# Patient Record
Sex: MALE | Race: WHITE | NOT HISPANIC OR LATINO | Employment: OTHER | URBAN - METROPOLITAN AREA
[De-identification: names, ages, dates, MRNs, and addresses within clinical notes are randomized per-mention and may not be internally consistent; named-entity substitution may affect disease eponyms.]

---

## 2017-02-18 ENCOUNTER — HOSPITAL ENCOUNTER (OUTPATIENT)
Facility: HOSPITAL | Age: 72
Discharge: HOME | End: 2017-02-19
Attending: EMERGENCY MEDICINE | Admitting: INTERNAL MEDICINE
Payer: MEDICARE

## 2017-02-18 DIAGNOSIS — R55 SYNCOPE, UNSPECIFIED SYNCOPE TYPE: Primary | ICD-10-CM

## 2017-02-18 LAB
ANION GAP SERPL CALC-SCNC: 9 MMOL/L
BASOPHILS # BLD AUTO: 0.1 K/UL
BASOPHILS NFR BLD: 0.5 %
BUN SERPL-MCNC: 17 MG/DL
CALCIUM SERPL-MCNC: 8.9 MG/DL
CHLORIDE SERPL-SCNC: 106 MMOL/L
CO2 SERPL-SCNC: 23 MMOL/L
CREAT SERPL-MCNC: 1.2 MG/DL
DIFFERENTIAL METHOD: ABNORMAL
EOSINOPHIL # BLD AUTO: 0.2 K/UL
EOSINOPHIL NFR BLD: 1.5 %
ERYTHROCYTE [DISTWIDTH] IN BLOOD BY AUTOMATED COUNT: 14.3 %
EST. GFR  (AFRICAN AMERICAN): >60 ML/MIN/1.73 M^2
EST. GFR  (NON AFRICAN AMERICAN): >60 ML/MIN/1.73 M^2
GLUCOSE SERPL-MCNC: 103 MG/DL
HCT VFR BLD AUTO: 42.7 %
HGB BLD-MCNC: 14.1 G/DL
INR PPP: 2.5
LYMPHOCYTES # BLD AUTO: 3 K/UL
LYMPHOCYTES NFR BLD: 25.3 %
MCH RBC QN AUTO: 29.4 PG
MCHC RBC AUTO-ENTMCNC: 33 %
MCV RBC AUTO: 89 FL
MONOCYTES # BLD AUTO: 0.5 K/UL
MONOCYTES NFR BLD: 4.6 %
NEUTROPHILS # BLD AUTO: 8 K/UL
NEUTROPHILS NFR BLD: 68.1 %
PLATELET # BLD AUTO: 193 K/UL
PMV BLD AUTO: 8.8 FL
POTASSIUM SERPL-SCNC: 3.7 MMOL/L
PROTHROMBIN TIME: 25.4 SEC
RBC # BLD AUTO: 4.79 M/UL
SODIUM SERPL-SCNC: 138 MMOL/L
TROPONIN I SERPL DL<=0.01 NG/ML-MCNC: <0.006 NG/ML
TSH SERPL DL<=0.005 MIU/L-ACNC: 0.95 UIU/ML
WBC # BLD AUTO: 11.8 K/UL

## 2017-02-18 PROCEDURE — 25000003 PHARM REV CODE 250: Performed by: EMERGENCY MEDICINE

## 2017-02-18 PROCEDURE — G0378 HOSPITAL OBSERVATION PER HR: HCPCS

## 2017-02-18 PROCEDURE — 93005 ELECTROCARDIOGRAM TRACING: CPT

## 2017-02-18 PROCEDURE — 85025 COMPLETE CBC W/AUTO DIFF WBC: CPT

## 2017-02-18 PROCEDURE — 84484 ASSAY OF TROPONIN QUANT: CPT

## 2017-02-18 PROCEDURE — 84443 ASSAY THYROID STIM HORMONE: CPT

## 2017-02-18 PROCEDURE — 99285 EMERGENCY DEPT VISIT HI MDM: CPT

## 2017-02-18 PROCEDURE — 85610 PROTHROMBIN TIME: CPT

## 2017-02-18 PROCEDURE — 36415 COLL VENOUS BLD VENIPUNCTURE: CPT

## 2017-02-18 PROCEDURE — 93010 ELECTROCARDIOGRAM REPORT: CPT | Mod: ,,, | Performed by: INTERNAL MEDICINE

## 2017-02-18 PROCEDURE — 84484 ASSAY OF TROPONIN QUANT: CPT | Mod: 91

## 2017-02-18 PROCEDURE — 80048 BASIC METABOLIC PNL TOTAL CA: CPT

## 2017-02-18 RX ORDER — TERAZOSIN 5 MG/1
5 CAPSULE ORAL NIGHTLY
Status: DISCONTINUED | OUTPATIENT
Start: 2017-02-18 | End: 2017-02-19

## 2017-02-18 RX ORDER — LOSARTAN POTASSIUM 100 MG/1
100 TABLET ORAL DAILY
Status: ON HOLD | COMMUNITY
End: 2017-02-18

## 2017-02-18 RX ORDER — LOSARTAN POTASSIUM 25 MG/1
100 TABLET ORAL DAILY
Status: DISCONTINUED | OUTPATIENT
Start: 2017-02-19 | End: 2017-02-19 | Stop reason: HOSPADM

## 2017-02-18 RX ORDER — TAMSULOSIN HYDROCHLORIDE 0.4 MG/1
0.4 CAPSULE ORAL DAILY
COMMUNITY
End: 2021-03-09 | Stop reason: SDUPTHER

## 2017-02-18 RX ORDER — NAPROXEN SODIUM 220 MG/1
81 TABLET, FILM COATED ORAL DAILY
Status: DISCONTINUED | OUTPATIENT
Start: 2017-02-19 | End: 2017-02-19 | Stop reason: HOSPADM

## 2017-02-18 RX ORDER — CLOPIDOGREL BISULFATE 75 MG/1
75 TABLET ORAL DAILY
Status: DISCONTINUED | OUTPATIENT
Start: 2017-02-19 | End: 2017-02-19 | Stop reason: HOSPADM

## 2017-02-18 RX ORDER — ONDANSETRON 2 MG/ML
4 INJECTION INTRAMUSCULAR; INTRAVENOUS EVERY 8 HOURS PRN
Status: DISCONTINUED | OUTPATIENT
Start: 2017-02-18 | End: 2017-02-19 | Stop reason: HOSPADM

## 2017-02-18 RX ORDER — TERAZOSIN 5 MG/1
5 CAPSULE ORAL NIGHTLY
Status: ON HOLD | COMMUNITY
End: 2017-02-18

## 2017-02-18 RX ORDER — BISOPROLOL FUMARATE 5 MG/1
5 TABLET, FILM COATED ORAL DAILY
Status: DISCONTINUED | OUTPATIENT
Start: 2017-02-19 | End: 2017-02-19

## 2017-02-18 RX ORDER — WARFARIN 2 MG/1
4 TABLET ORAL DAILY
Status: DISCONTINUED | OUTPATIENT
Start: 2017-02-19 | End: 2017-02-19 | Stop reason: HOSPADM

## 2017-02-18 RX ORDER — BISOPROLOL FUMARATE AND HYDROCHLOROTHIAZIDE 5; 6.25 MG/1; MG/1
1 TABLET ORAL DAILY
Status: ON HOLD | COMMUNITY
End: 2017-02-18

## 2017-02-18 RX ORDER — BISOPROLOL FUMARATE AND HYDROCHLOROTHIAZIDE 5; 6.25 MG/1; MG/1
1 TABLET ORAL DAILY
Status: DISCONTINUED | OUTPATIENT
Start: 2017-02-19 | End: 2017-02-18 | Stop reason: CLARIF

## 2017-02-18 RX ORDER — PRAVASTATIN SODIUM 40 MG/1
80 TABLET ORAL DAILY
Status: DISCONTINUED | OUTPATIENT
Start: 2017-02-19 | End: 2017-02-19 | Stop reason: HOSPADM

## 2017-02-18 RX ORDER — METOPROLOL TARTRATE 25 MG/1
25 TABLET, FILM COATED ORAL 2 TIMES DAILY
Status: DISCONTINUED | OUTPATIENT
Start: 2017-02-18 | End: 2017-02-19

## 2017-02-18 RX ADMIN — METOPROLOL TARTRATE 25 MG: 25 TABLET ORAL at 08:02

## 2017-02-18 RX ADMIN — TERAZOSIN HYDROCHLORIDE ANHYDROUS 5 MG: 5 CAPSULE ORAL at 08:02

## 2017-02-18 NOTE — IP AVS SNAPSHOT
[address] [place] [address]  Phone: [phone]           Patient Discharge Instructions     Our goal is to set you up for success. This packet includes information on your condition, medications, and your home care. It will help you to care for yourself so you don't get sicker and need to go back to the hospital.     Please ask your nurse if you have any questions.        There are many details to remember when preparing to leave the hospital. Here is what you will need to do:    1. Take your medicine. If you are prescribed medications, review your Medication List in the following pages. You may have new medications to  at the pharmacy and others that you'll need to stop taking. Review the instructions for how and when to take your medications. Talk with your doctor or nurses if you are unsure of what to do.     2. Go to your follow-up appointments. Specific follow-up information is listed in the following pages. Your may be contacted by a transition nurse or clinical provider about future appointments. Be sure we have all of the phone numbers to reach you, if needed. Please contact your provider's office if you are unable to make an appointment.     3. Watch for warning signs. Your doctor or nurse will give you detailed warning signs to watch for and when to call for assistance. These instructions may also include educational information about your condition. If you experience any of warning signs to your health, call your doctor.               [facility]  Unless otherwise directed by your provider, please contact [facility], our nurse care line that is available for 24/7 assistance.     [phone] (toll-free)    Registered nurses in the [facility] provide clinical advisement, health education, appointment booking, and other advisory services.                    ** Verify the list of medication(s) below is accurate and up to date.  Carry this with you in case of emergency. If your medications have changed, please notify your healthcare provider.             Medication List      CONTINUE taking these medications        Additional Info                      aspirin 81 MG Chew   Refills:  0   Dose:  81 mg    Last time this was given:  81 mg on 2/19/2017  9:34 AM   Instructions:  Take 81 mg by mouth once daily.     Begin Date    AM    Noon    PM    Bedtime       clopidogrel 75 mg tablet   Commonly known as:  PLAVIX   Refills:  1    Last time this was given:  75 mg on 2/19/2017  9:34 AM     Begin Date    AM    Noon    PM    Bedtime       EDARBYCLOR 40-12.5 mg Tab   Refills:  0   Generic drug:  azilsartan med-chlorthalidone    Instructions:  Take by mouth.     Begin Date    AM    Noon    PM    Bedtime       FLUZONE HIGH-DOSE 2015-16 (PF) 180 mcg/0.5 mL Syrg   Refills:  0   Generic drug:  flu vacc ts 2015-16(65yr+)(PF)      Begin Date    AM    Noon    PM    Bedtime       folic acid 1 MG tablet   Commonly known as:  FOLVITE   Refills:  2      Begin Date    AM    Noon    PM    Bedtime       metoprolol tartrate 25 MG tablet   Commonly known as:  LOPRESSOR   Refills:  1    Last time this was given:  25 mg on 2/18/2017  8:19 PM     Begin Date    AM    Noon    PM    Bedtime       pravastatin 80 MG tablet   Commonly known as:  PRAVACHOL   Refills:  2    Last time this was given:  80 mg on 2/19/2017  9:34 AM     Begin Date    AM    Noon    PM    Bedtime       tamsulosin 0.4 mg Cp24   Commonly known as:  FLOMAX   Refills:  0   Dose:  0.4 mg    Instructions:  Take 0.4 mg by mouth once daily.     Begin Date    AM    Noon    PM    Bedtime       warfarin 4 MG tablet   Commonly known as:  COUMADIN   Refills:  0      Begin Date    AM    Noon    PM    Bedtime                  Please bring to all follow up appointments:    1. A copy of your discharge instructions.  2. All medicines you are currently taking in their original bottles.  3. Identification and insurance  card.    Please arrive 15 minutes ahead of scheduled appointment time.    Please call 24 hours in advance if you must reschedule your appointment and/or time.        Follow-up Information     Follow up with Travis Go MD In 1 week.    Specialty:  Internal Medicine    Why:  follow up of blood pressure    Contact information:    [address] [place] [address]  [phone]          Discharge Instructions     Future Orders    Diet general     Questions:    Total calories:      Fat restriction, if any:      Protein restriction, if any:      Na restriction, if any:      Fluid restriction:      Additional restrictions:          Discharge Instructions       Thank you for choosing [facility] for your medical care. The primary doctor who is taking care of you at the time of your discharge is Alicia Coy MD.     You were admitted to the hospital with Syncope.     Please note your discharge instructions, including diet/activity restrictions, follow-up appointments, and medication changes.  If you have any questions about your medical issues, prescriptions, or any other questions, please feel free to contact the [facility] Medicine Dept at [phone] and we will help.    If you are previously with Home health, outpatient PT/OT or under a therapy program, you are cleared to return to those programs.    Please direct all long term medication refills and follow up to your primary care provider, Travis Go MD. Thank you again for letting us take care of your health care needs.      Discharge References/Attachments     NEAR SYNCOPE, UNKNOWN (ENGLISH)    SYNCOPE, TREATING: PREVENTION (ENGLISH)        Primary Diagnosis     Your primary diagnosis was:  Fainting      Admission Information     Date & Time Provider Department [facility]    2/18/2017  3:51 PM Alicia Coy MD [facility] [id number]      Care Providers     Provider Role Specialty Primary office phone     "Alicia Coy MD Attending Provider Internal Medicine [phone]      Your Vitals Were     BP Pulse Temp Resp Height Weight    112/72 (BP Location: Left arm, Patient Position: Lying) 86 97.5 °F (36.4 °C) (Oral) 18 5' 9" (1.753 m) 111.1 kg (245 lb)    SpO2 BMI             96% 36.18 kg/m2         Recent Lab Values     No lab values to display.      Allergies as of 2/19/2017     No Known Allergies      Advance Directives     An advance directive is a document which, in the event you are no longer able to make decisions for yourself, tells your healthcare team what kind of treatment you do or do not want to receive, or who you would like to make those decisions for you.  If you do not currently have an advance directive, [facility] encourages you to create one.  For more information call:  [phone]), [phone] ([phone]),  or log on to [email or web address].        Smoking Cessation     If you would like to quit smoking:   You may be eligible for free services if you are a [place] resident and started smoking cigarettes before September 1, 1988.  Call the Smoking Cessation [employer] (RUST) toll free at (563) 423-7900 or (655) 931-5293.   Call 4-141-QUIT-NOW if you do not meet the above criteria.            Language Assistance Services     ATTENTION: Language assistance services are available, free of charge. Please call [phone].      ATENCIÓN: Si habla español, tiene a baker disposición servicios gratuitos de asistencia lingüística. Llame al [phone].     CHÚ Ý: N?u b?n nói Ti?ng Vi?t, có các d?ch v? h? tr? ngôn ng? mi?n phí dành cho b?n. G?i s? [phone].        Coumadin Discharge Instructions                         [facility] Sign-Up     Activating your [facility] account is as easy as 1-2-3!     1) Visit [email or web address], select Sign Up Now, enter this activation code and your date of birth, then select Next.  [email or web address]  Expires: 4/5/2017  1:08 PM      2) Create a username " and password to use when you visit [facility] in the future and select a security question in case you lose your password and select Next.    3) Enter your e-mail address and click Sign Up!    Additional Information  If you have questions, please e-mail [email or web address] or call [phone] to talk to our [facility] staff. Remember, [facility] is NOT to be used for urgent needs. For medical emergencies, dial 911.          [facility] complies with applicable Federal civil rights laws and does not discriminate on the basis of race, color, national origin, age, disability, or sex.

## 2017-02-18 NOTE — ED NOTES
Report given to Omi (RN, second floor).  Patient prepared for transport to bed 213 A; pending notification of Dr. Tovar.

## 2017-02-18 NOTE — ED NOTES
AAOx4, skin warm/dry, respirations even/unlabored.  NAD.  Placed on cardiac, BP and O2 sat monitors.

## 2017-02-18 NOTE — ED PROVIDER NOTES
Encounter Date: 2/18/2017    SCRIBE #1 NOTE: I, Mia Preston, am scribing for, and in the presence of, Dr. Mathis.       History     Chief Complaint   Patient presents with    Loss of Consciousness     Review of patient's allergies indicates:  No Known Allergies  HPI Comments: 02/18/2017  3:59 PM     Chief Complaint: Syncopal Episode      The patient is a 71 y.o. male with a PMHx of anticoagulant long-term use on Coumadin and Plavix; clotting disorder; CAD; encounter for blood transfusion; and HTN who is presenting with a sudden onset of an acute syncopal episode that occurred PTA. Pt reported that he was standing waiting for the parade to start when he started to c/o light-headedness. Then he got a chair and sat down in the shade, which is the last thing he remembers. Pt stated that the sun was out and he was wearing a jacket today. His wife reported that that the pt was only standing for 15 minutes prior to sitting down. She stated that their granddaughter was talking with the pt prior to the syncopal episode, but then the pt stopped responding to her. His wife reported that they removed the pt's sunglasses and his eyes were open, but he was unresponsive. She stated that they poured ice water on him and then he came to. The syncopal episode lasted about 1 minute or so. Upon arrival to ED, pt c/o continued light-headedness. He reported feeling fine this morning. No hx of syncope. No fever or appetite change. No rhinorrhea. No cough or SOB. No CP. No blood in stools, dark stools, vomiting, or diarrhea. No headache. No EtOH use today. Pt has a past surgical history that includes CABG; knee surgery ,back surgery- bilateral; and Cholecystectomy.      The history is provided by the patient and the spouse.     Past Medical History   Diagnosis Date    Anticoagulant long-term use     Clotting disorder     Coronary artery disease     Encounter for blood transfusion     Hypertension      Past Medical History  Pertinent Negatives   Diagnosis Date Noted    Transfusion reaction 12/15/2015     Past Surgical History   Procedure Laterality Date    Coronary artery bypass graft      Knee surgery ,back surgery(bilateral)      Cholecystectomy       History reviewed. No pertinent family history.  Social History   Substance Use Topics    Smoking status: Former Smoker     Quit date: 1/1/1980    Smokeless tobacco: None      Comment: smoked pipe    Alcohol use 0.0 oz/week     0 Standard drinks or equivalent per week      Comment: socially     Review of Systems   Constitutional: Negative for appetite change and fever.   HENT: Negative for rhinorrhea.    Eyes: Negative for visual disturbance.   Respiratory: Negative for cough and shortness of breath.    Cardiovascular: Negative for chest pain.   Gastrointestinal: Negative for blood in stool, diarrhea and vomiting.        No dark stools.   Genitourinary: Negative for dysuria.   Musculoskeletal: Negative for back pain.   Skin: Negative for rash.   Neurological: Positive for syncope and light-headedness. Negative for headaches.   Hematological: Does not bruise/bleed easily.   Psychiatric/Behavioral: Negative for confusion.       Physical Exam   Initial Vitals   BP Pulse Resp Temp SpO2   02/18/17 1554 02/18/17 1554 02/18/17 1554 02/18/17 1554 02/18/17 1554   128/73 69 16 97.6 °F (36.4 °C) 97 %     Physical Exam    Nursing note and vitals reviewed.  Constitutional: He appears well-developed and well-nourished.   HENT:   Head: Normocephalic and atraumatic.   Mouth/Throat: Oropharynx is clear and moist.   Eyes: Conjunctivae and EOM are normal. Pupils are equal, round, and reactive to light.   Neck: Neck supple.   Cardiovascular: Normal rate, regular rhythm, normal heart sounds and intact distal pulses. Exam reveals no gallop and no friction rub.    No murmur heard.  Pulmonary/Chest: Breath sounds normal. He has no wheezes. He has no rhonchi. He has no rales.   Abdominal: Soft. He  exhibits no distension. There is no tenderness.   Musculoskeletal: Normal range of motion.   Trace pitting pedal edema bilaterally.   Neurological: He is alert and oriented to person, place, and time. Gait normal.   Cranial nerves 3-12 intact.  5/5 strength and sensation.   Skin:   Well healed sternal incision.   Psychiatric: He has a normal mood and affect.         ED Course   Procedures  Labs Reviewed   CBC W/ AUTO DIFFERENTIAL - Abnormal; Notable for the following:        Result Value    MPV 8.8 (*)     Gran # 8.0 (*)     All other components within normal limits   PROTIME-INR - Abnormal; Notable for the following:     Prothrombin Time 25.4 (*)     INR 2.5 (*)     All other components within normal limits   BASIC METABOLIC PANEL   TROPONIN I                        Scribe Attestation:   Scribe #1: I performed the above scribed service and the documentation accurately describes the services I performed. I attest to the accuracy of the note.    Attending Attestation:           Physician Attestation for Scribe:  Physician Attestation Statement for Scribe #1: I, Dr. Mathis, reviewed documentation, as scribed by Mia Preston in my presence, and it is both accurate and complete.         Jeremias Hall is a 71 y.o. male presenting with syncopal episode.  Etiology is uncertain.  Patient has known history of CAD.  He is asymptomatic at present.  Very low suspicion for intracranial process such as CVA.  I do not think further brain imaging is indicated.  I will admit for further cardiac monitoring.  Initial EKG shows no sign of acute ischemia or arrhythmia.  Initial cardiac biomarker is negative.  I will bed to hospitalist service for further workup.  He does have history of DVT but is therapeutic on Coumadin with no signs of PE such as chest pain or dyspnea with no tachycardia or hypoxia on evaluation here.  I do not think further d-dimer or CT angiography are indicated.  I will discuss with hospitalist service  for admission to Dr. Coy.        ED Course   Comment By Time   EKG:  NSR, rate of 70, normal intervals, L axis.  Old appearing T-wave flattening in II, aVF, aVL.  No new ST/T wave changes compared to last prior.  No sign of arrhythmia. Jordon Mathis MD 02/18 1300     Clinical Impression:   The encounter diagnosis was Syncope, unspecified syncope type.          Jordon Mathis MD  02/18/17 7705

## 2017-02-19 VITALS
OXYGEN SATURATION: 96 % | HEART RATE: 86 BPM | SYSTOLIC BLOOD PRESSURE: 112 MMHG | WEIGHT: 245 LBS | HEIGHT: 69 IN | DIASTOLIC BLOOD PRESSURE: 72 MMHG | TEMPERATURE: 98 F | BODY MASS INDEX: 36.29 KG/M2 | RESPIRATION RATE: 18 BRPM

## 2017-02-19 PROBLEM — Z92.29 HX: LONG TERM ANTICOAGULANT USE: Status: ACTIVE | Noted: 2017-02-19

## 2017-02-19 PROBLEM — I10 ESSENTIAL HYPERTENSION: Status: ACTIVE | Noted: 2017-02-19

## 2017-02-19 PROBLEM — I25.10 CORONARY ARTERY DISEASE INVOLVING NATIVE CORONARY ARTERY WITHOUT ANGINA PECTORIS: Status: ACTIVE | Noted: 2017-02-19

## 2017-02-19 LAB
AORTIC VALVE REGURGITATION: NORMAL
BASOPHILS # BLD AUTO: 0 K/UL
BASOPHILS # BLD AUTO: 0 K/UL
BASOPHILS NFR BLD: 0.4 %
BASOPHILS NFR BLD: 0.4 %
DIASTOLIC DYSFUNCTION: NO
DIFFERENTIAL METHOD: ABNORMAL
DIFFERENTIAL METHOD: ABNORMAL
EOSINOPHIL # BLD AUTO: 0.2 K/UL
EOSINOPHIL # BLD AUTO: 0.2 K/UL
EOSINOPHIL NFR BLD: 3.2 %
EOSINOPHIL NFR BLD: 3.2 %
ERYTHROCYTE [DISTWIDTH] IN BLOOD BY AUTOMATED COUNT: 14.7 %
ERYTHROCYTE [DISTWIDTH] IN BLOOD BY AUTOMATED COUNT: 14.7 %
ESTIMATED PA SYSTOLIC PRESSURE: 24.16
HCT VFR BLD AUTO: 38.3 %
HCT VFR BLD AUTO: 38.3 %
HGB BLD-MCNC: 12.6 G/DL
HGB BLD-MCNC: 12.6 G/DL
INR PPP: 2.5
LYMPHOCYTES # BLD AUTO: 3.5 K/UL
LYMPHOCYTES # BLD AUTO: 3.5 K/UL
LYMPHOCYTES NFR BLD: 47.1 %
LYMPHOCYTES NFR BLD: 47.1 %
MCH RBC QN AUTO: 29.2 PG
MCH RBC QN AUTO: 29.2 PG
MCHC RBC AUTO-ENTMCNC: 32.9 %
MCHC RBC AUTO-ENTMCNC: 32.9 %
MCV RBC AUTO: 89 FL
MCV RBC AUTO: 89 FL
MITRAL VALVE REGURGITATION: NORMAL
MONOCYTES # BLD AUTO: 0.5 K/UL
MONOCYTES # BLD AUTO: 0.5 K/UL
MONOCYTES NFR BLD: 6.7 %
MONOCYTES NFR BLD: 6.7 %
NEUTROPHILS # BLD AUTO: 3.1 K/UL
NEUTROPHILS # BLD AUTO: 3.1 K/UL
NEUTROPHILS NFR BLD: 42.6 %
NEUTROPHILS NFR BLD: 42.6 %
PLATELET # BLD AUTO: 173 K/UL
PLATELET # BLD AUTO: 173 K/UL
PMV BLD AUTO: 9.2 FL
PMV BLD AUTO: 9.2 FL
PROTHROMBIN TIME: 25 SEC
RBC # BLD AUTO: 4.31 M/UL
RBC # BLD AUTO: 4.31 M/UL
RETIRED EF AND QEF - SEE NOTES: 65 (ref 55–65)
TRICUSPID VALVE REGURGITATION: NORMAL
TROPONIN I SERPL DL<=0.01 NG/ML-MCNC: 0.01 NG/ML
TROPONIN I SERPL DL<=0.01 NG/ML-MCNC: 0.01 NG/ML
WBC # BLD AUTO: 7.4 K/UL
WBC # BLD AUTO: 7.4 K/UL

## 2017-02-19 PROCEDURE — 25000003 PHARM REV CODE 250: Performed by: NURSE PRACTITIONER

## 2017-02-19 PROCEDURE — 25000003 PHARM REV CODE 250: Performed by: EMERGENCY MEDICINE

## 2017-02-19 PROCEDURE — 85610 PROTHROMBIN TIME: CPT

## 2017-02-19 PROCEDURE — 84484 ASSAY OF TROPONIN QUANT: CPT

## 2017-02-19 PROCEDURE — 93306 TTE W/DOPPLER COMPLETE: CPT

## 2017-02-19 PROCEDURE — 85025 COMPLETE CBC W/AUTO DIFF WBC: CPT

## 2017-02-19 PROCEDURE — 36415 COLL VENOUS BLD VENIPUNCTURE: CPT

## 2017-02-19 PROCEDURE — G0378 HOSPITAL OBSERVATION PER HR: HCPCS

## 2017-02-19 RX ORDER — METOPROLOL TARTRATE 25 MG/1
12.5 TABLET ORAL 2 TIMES DAILY
Status: DISCONTINUED | OUTPATIENT
Start: 2017-02-19 | End: 2017-02-19 | Stop reason: HOSPADM

## 2017-02-19 RX ORDER — SODIUM CHLORIDE 9 MG/ML
INJECTION, SOLUTION INTRAVENOUS CONTINUOUS
Status: DISCONTINUED | OUTPATIENT
Start: 2017-02-19 | End: 2017-02-19 | Stop reason: HOSPADM

## 2017-02-19 RX ADMIN — SODIUM CHLORIDE: 0.9 INJECTION, SOLUTION INTRAVENOUS at 01:02

## 2017-02-19 RX ADMIN — ASPIRIN 81 MG CHEWABLE TABLET 81 MG: 81 TABLET CHEWABLE at 09:02

## 2017-02-19 RX ADMIN — PRAVASTATIN SODIUM 80 MG: 40 TABLET ORAL at 09:02

## 2017-02-19 RX ADMIN — CLOPIDOGREL BISULFATE 75 MG: 75 TABLET ORAL at 09:02

## 2017-02-19 NOTE — UM SECONDARY REVIEW
Physician Advisor External    Level of Care Issue    Per Dr. Shah at [facility], pt is OP appropriate for admit 2/18/2017

## 2017-02-19 NOTE — H&P
[facility] Hospital Medicine  History & Physical    Patient Name: Jeremias Hall  MRN: [id number]  Admission Date: 2/18/2017  Attending Physician: Alicia Coy MD   Primary Care Provider: Travis Go MD         Patient information was obtained from patient and ER records.     Subjective:     Principal Problem:Syncope    Chief Complaint:   Chief Complaint   Patient presents with    Loss of Consciousness        HPI: Jeremias Hall is a 71 y.o. Male with PMHx significant for CAD, HTN, DVT on OAC.  He was admitted to the service of hospital medicine with syncope.  He reported to ED via EMS after he became unresponsive at a parade earlier in the day.  The patient reports feeling dizzy while outside at parade, he sat down, and then he passed out.  The witnesses reported the event lasted ~1 minute and they were able to wake him by splashing cold water in his face.  He was disoriented upon awakening and does not recall the event.  He denies anything like this happening before but does endorse historical dizziness with position changes.  He denies any chest pain, SOB, or focal neurologic deficits.  He stated he drank two cups of coffee this AM with his medications--which includes a diuretic--and did not drink any water or ETOH at parade and feels that his lack of hydration in the hot weather could have been problematic.  Other pertinent medical hx as below:    Past Medical History   Diagnosis Date    Anticoagulant long-term use     Clotting disorder     Coronary artery disease     Encounter for blood transfusion     Hypertension        Past Surgical History   Procedure Laterality Date    Coronary artery bypass graft      Knee surgery ,back surgery(bilateral)      Cholecystectomy         Review of patient's allergies indicates:  No Known Allergies    No current facility-administered medications on file prior to encounter.      Current Outpatient Prescriptions on File Prior to  Encounter   Medication Sig    aspirin 81 MG Chew Take 81 mg by mouth once daily.    clopidogrel (PLAVIX) 75 mg tablet     FLUZONE HIGH-DOSE 2015-16, PF, 180 mcg/0.5 mL Syrg     folic acid (FOLVITE) 1 MG tablet     metoprolol tartrate (LOPRESSOR) 25 MG tablet     pravastatin (PRAVACHOL) 80 MG tablet     warfarin (COUMADIN) 4 MG tablet      Family History     None        Social History Main Topics    Smoking status: Former Smoker     Quit date: 1/1/1980    Smokeless tobacco: Not on file      Comment: smoked pipe    Alcohol use 0.0 oz/week     0 Standard drinks or equivalent per week      Comment: socially    Drug use: No    Sexual activity: Not on file     Review of Systems   Constitutional: Negative for activity change, chills, fatigue and fever.   HENT: Negative for congestion, postnasal drip, sinus pressure, sore throat and trouble swallowing.    Eyes: Negative for redness and visual disturbance.   Respiratory: Negative for cough, chest tightness, shortness of breath and wheezing.    Cardiovascular: Positive for leg swelling (chronic, better than baseline). Negative for chest pain and palpitations.   Gastrointestinal: Negative for abdominal distention, abdominal pain, constipation, diarrhea, nausea and vomiting.   Genitourinary: Negative for difficulty urinating, dysuria and hematuria.   Musculoskeletal: Negative for arthralgias, back pain and gait problem.   Skin: Negative for color change.   Neurological: Positive for dizziness and syncope. Negative for seizures, speech difficulty, weakness, light-headedness, numbness and headaches.   Psychiatric/Behavioral: Positive for confusion (immediately upon awakening per wife). Negative for agitation and behavioral problems. The patient is not nervous/anxious.      Objective:     Vital Signs (Most Recent):  Temp: 98.2 °F (36.8 °C) (02/18/17 2243)  Pulse: 87 (02/18/17 2245)  Resp: 18 (02/18/17 2243)  BP: 97/62 (02/18/17 2245)  SpO2: 95 % (02/18/17 2243) Vital  Signs (24h Range):  Temp:  [97.1 °F (36.2 °C)-98.2 °F (36.8 °C)] 98.2 °F (36.8 °C)  Pulse:  [69-87] 87  Resp:  [16-20] 18  SpO2:  [94 %-97 %] 95 %  BP: ()/(60-79) 97/62     Weight: 111.1 kg (245 lb)  Body mass index is 36.18 kg/(m^2).    Physical Exam   Constitutional: He is oriented to person, place, and time. He appears well-developed and well-nourished. No distress.   HENT:   Head: Normocephalic and atraumatic.   Eyes: Conjunctivae and EOM are normal. Pupils are equal, round, and reactive to light.   Neck: Normal range of motion. Neck supple. No thyromegaly present.   Cardiovascular: Normal rate, regular rhythm, normal heart sounds and intact distal pulses.    Pulmonary/Chest: Effort normal and breath sounds normal. No respiratory distress.   midsternal incision   Abdominal: Soft. Bowel sounds are normal. He exhibits no distension. There is no tenderness.   Musculoskeletal: Normal range of motion. He exhibits edema (1+ BLE edema). He exhibits no tenderness.   Neurological: He is alert and oriented to person, place, and time. No cranial nerve deficit. He exhibits normal muscle tone. Coordination normal.   Skin: Skin is warm and dry. No erythema.   Psychiatric: He has a normal mood and affect. His behavior is normal. Judgment and thought content normal.        Significant Labs:   CBC:   Recent Labs  Lab 02/18/17  1623   WBC 11.80   HGB 14.1   HCT 42.7        CMP:   Recent Labs  Lab 02/18/17  1623      K 3.7      CO2 23      BUN 17   CREATININE 1.2   CALCIUM 8.9   ANIONGAP 9   EGFRNONAA >60     Troponin:   Recent Labs  Lab 02/18/17  1623 02/18/17  2341   TROPONINI <0.006 0.007     EKG: SR 70; Left Axis deviation. T waves flattened--unchanged from prior-2015 (my read)    Assessment/Plan:     * Syncope  Neuro-checks. Tele-monitoring. Reviewed TSH--normal range. Orthostatic negative. Will check 2D Echocardiogram, as well as carotid U/S. Fall precautions. Seizure  precautions.          Essential hypertension  Chronic, controlled on current regimen.  Monitor closely and titrate medications as required for sustained BP control.      Coronary artery disease involving native coronary artery without angina pectoris  Chronic, no anginal symptoms.  Troponins negative thus far, trending.  EKG without acute findings.  Continue ASA, Plavix, BBlockade, statin therapy.  Monitor on telemetry.      HX: long term anticoagulant use  Hx DVT.  Continue warfarin therapy-- INR therapeutic at 2.5.  Daily INR.    Given reported disorientation after event, will check EEG to rule out seizure.  Patient with no neuro deficits and reports historical positional dizziness-- first episode with loss of consciousness. Discussed in detail with patient and wife.     VTE Risk Mitigation     Medium--Continue coumadin therapy.        Anette Pathak NP  Department of Hospital Medicine   [facility]

## 2017-02-19 NOTE — PLAN OF CARE
Problem: Patient Care Overview  Goal: Plan of Care Review  Outcome: Ongoing (interventions implemented as appropriate)  Patient alert and oriented resting in bed. NAD. Denies pain or SOB. VSS. Normal SR. Urinal. Pt wife at bedside throughout shift. Plan of care reviewed with patient. Verbalizes understanding.Call light in reach. Pt free from fall or injury. Will monitor.

## 2017-02-19 NOTE — ASSESSMENT & PLAN NOTE
Chronic, controlled on current regimen.  Monitor closely and titrate medications as required for sustained BP control.

## 2017-02-19 NOTE — PLAN OF CARE
02/19/17 0039   PRE-TX-O2-ETCO2   O2 Device (Oxygen Therapy) room air   SpO2 (!) 93 %   Pulse 66   Pt is currently on RA. Will continue to monitor.

## 2017-02-19 NOTE — PLAN OF CARE
CM met with patient with family at bedside and verified demographic and insurance information. Patient is independent at home and denies any dc needs at this time. Pharmacy is [facility] on [address] in [place] PCP is Dr. Gaffney. Dr. Gaffney manages patient coumadin which he takes for Hx of DVT 10 years ago.      02/19/17 1319   Discharge Assessment   Assessment Type Discharge Planning Assessment   Confirmed/corrected address and phone number on facesheet? Yes   Assessment information obtained from? Patient;Caregiver   Communicated expected length of stay with patient/caregiver yes   Prior to hospitilization cognitive status: Alert/Oriented   Prior to hospitalization functional status: Independent   Current cognitive status: Alert/Oriented   Current Functional Status: Independent   Arrived From home or self-care   Lives With spouse   Able to Return to Prior Arrangements yes   Is patient able to care for self after discharge? Yes   How many people do you have in your home that can help with your care after discharge? 1   Who are your caregiver(s) and their phone number(s)? Nohemi Hall [phone]   Patient's perception of discharge disposition home or selfcare   Readmission Within The Last 30 Days no previous admission in last 30 days   Patient currently being followed by outpatient case management? No   Patient currently receives home health services? No   Does the patient currently use HME? No   Patient currently receives private duty nursing? No   Patient currently receives any other outside agency services? No   Equipment Currently Used at Home none   Do you have any problems affording any of your prescribed medications? No   Is the patient taking medications as prescribed? yes   Do you have any financial concerns preventing you from receiving the healthcare you need? No   Does the patient have transportation to healthcare appointments? Yes   Transportation Available car   On Dialysis? No   Does the patient receive  services at the Coumadin Clinic? No   Are there any open cases? No   Discharge Plan A Home   Patient/Family In Agreement With Plan yes

## 2017-02-19 NOTE — ASSESSMENT & PLAN NOTE
Chronic, no anginal symptoms.  Troponins negative thus far, trending.  EKG without acute findings.  Continue ASA, Plavix, BBlockade, statin therapy.  Monitor on telemetry.

## 2017-02-19 NOTE — SUBJECTIVE & OBJECTIVE
Past Medical History   Diagnosis Date    Anticoagulant long-term use     Clotting disorder     Coronary artery disease     Encounter for blood transfusion     Hypertension        Past Surgical History   Procedure Laterality Date    Coronary artery bypass graft      Knee surgery ,back surgery(bilateral)      Cholecystectomy         Review of patient's allergies indicates:  No Known Allergies    No current facility-administered medications on file prior to encounter.      Current Outpatient Prescriptions on File Prior to Encounter   Medication Sig    aspirin 81 MG Chew Take 81 mg by mouth once daily.    clopidogrel (PLAVIX) 75 mg tablet     FLUZONE HIGH-DOSE 2015-16, PF, 180 mcg/0.5 mL Syrg     folic acid (FOLVITE) 1 MG tablet     metoprolol tartrate (LOPRESSOR) 25 MG tablet     pravastatin (PRAVACHOL) 80 MG tablet     warfarin (COUMADIN) 4 MG tablet      Family History     None        Social History Main Topics    Smoking status: Former Smoker     Quit date: 1/1/1980    Smokeless tobacco: Not on file      Comment: smoked pipe    Alcohol use 0.0 oz/week     0 Standard drinks or equivalent per week      Comment: socially    Drug use: No    Sexual activity: Not on file     Review of Systems   Constitutional: Negative for activity change, chills, fatigue and fever.   HENT: Negative for congestion, postnasal drip, sinus pressure, sore throat and trouble swallowing.    Eyes: Negative for redness and visual disturbance.   Respiratory: Negative for cough, chest tightness, shortness of breath and wheezing.    Cardiovascular: Positive for leg swelling (chronic, better than baseline). Negative for chest pain and palpitations.   Gastrointestinal: Negative for abdominal distention, abdominal pain, constipation, diarrhea, nausea and vomiting.   Genitourinary: Negative for difficulty urinating, dysuria and hematuria.   Musculoskeletal: Negative for arthralgias, back pain and gait problem.   Skin: Negative for  color change.   Neurological: Positive for dizziness and syncope. Negative for seizures, speech difficulty, weakness, light-headedness, numbness and headaches.   Psychiatric/Behavioral: Positive for confusion (immediately upon awakening per wife). Negative for agitation and behavioral problems. The patient is not nervous/anxious.      Objective:     Vital Signs (Most Recent):  Temp: 98.2 °F (36.8 °C) (02/18/17 2243)  Pulse: 87 (02/18/17 2245)  Resp: 18 (02/18/17 2243)  BP: 97/62 (02/18/17 2245)  SpO2: 95 % (02/18/17 2243) Vital Signs (24h Range):  Temp:  [97.1 °F (36.2 °C)-98.2 °F (36.8 °C)] 98.2 °F (36.8 °C)  Pulse:  [69-87] 87  Resp:  [16-20] 18  SpO2:  [94 %-97 %] 95 %  BP: ()/(60-79) 97/62     Weight: 111.1 kg (245 lb)  Body mass index is 36.18 kg/(m^2).    Physical Exam   Constitutional: He is oriented to person, place, and time. He appears well-developed and well-nourished. No distress.   HENT:   Head: Normocephalic and atraumatic.   Eyes: Conjunctivae and EOM are normal. Pupils are equal, round, and reactive to light.   Neck: Normal range of motion. Neck supple. No thyromegaly present.   Cardiovascular: Normal rate, regular rhythm, normal heart sounds and intact distal pulses.    Pulmonary/Chest: Effort normal and breath sounds normal. No respiratory distress.   midsternal incision   Abdominal: Soft. Bowel sounds are normal. He exhibits no distension. There is no tenderness.   Musculoskeletal: Normal range of motion. He exhibits edema (1+ BLE edema). He exhibits no tenderness.   Neurological: He is alert and oriented to person, place, and time. No cranial nerve deficit. He exhibits normal muscle tone. Coordination normal.   Skin: Skin is warm and dry. No erythema.   Psychiatric: He has a normal mood and affect. His behavior is normal. Judgment and thought content normal.        Significant Labs:   CBC:   Recent Labs  Lab 02/18/17  1623   WBC 11.80   HGB 14.1   HCT 42.7        CMP:   Recent  Labs  Lab 02/18/17  1623      K 3.7      CO2 23      BUN 17   CREATININE 1.2   CALCIUM 8.9   ANIONGAP 9   EGFRNONAA >60     Troponin:   Recent Labs  Lab 02/18/17  1623 02/18/17  2341   TROPONINI <0.006 0.007     EKG: SR 70; Left Axis deviation. T waves flattened--unchanged from prior-2015 (my read)

## 2017-02-19 NOTE — ASSESSMENT & PLAN NOTE
Neuro-checks. Tele-monitoring. Reviewed TSH--normal range. Orthostatic negative. Will check 2D Echocardiogram, as well as carotid U/S. Fall precautions. Seizure precautions.

## 2017-02-19 NOTE — DISCHARGE INSTRUCTIONS
Thank you for choosing [facility] for your medical care. The primary doctor who is taking care of you at the time of your discharge is Alicia Coy MD.     You were admitted to the hospital with Syncope.     Please note your discharge instructions, including diet/activity restrictions, follow-up appointments, and medication changes.  If you have any questions about your medical issues, prescriptions, or any other questions, please feel free to contact the [facility] Medicine Dept at [phone] and we will help.    If you are previously with Home health, outpatient PT/OT or under a therapy program, you are cleared to return to those programs.    Please direct all long term medication refills and follow up to your primary care provider, Travis Go MD. Thank you again for letting us take care of your health care needs.

## 2017-02-23 NOTE — ASSESSMENT & PLAN NOTE
Neuro-checks. Tele-monitoring. Reviewed TSH--normal range. Orthostatic negative. 2D Echocardiogram, as well as carotid U/S complete and normal result.    Risk assessment according to [place] Syncope Rule is as follows:  CHF history: no  Hematocrit < 30%: no  EKG abnormal (New ECG change from any source, any non-sinus rhythm on EKG or monitoring): no  Shortness of breath symptoms: no  Systolic BP <90 mmHg at Triage: no    Patient is at low risk of syncope.

## 2017-02-23 NOTE — DISCHARGE SUMMARY
[facility] Medicine  Discharge Summary      Patient Name: Jeremias Hall  MRN: [id number]  Admission Date: 2/18/2017  Hospital Length of Stay: 0 days  Discharge Date and Time: 2/19/2017  2:24 PM  Attending Physician: Yaa att. providers found   Discharging Provider: Dagoberto Tovar MD  Primary Care Provider: Travis Go MD      HPI:   Jeremias Hall is a 71 y.o. Male with PMHx significant for CAD, HTN, DVT on OAC.  He was admitted to the service of hospital medicine with syncope.  He reported to ED via EMS after he became unresponsive at a parade earlier in the day.  The patient reports feeling dizzy while outside at parade, he sat down, and then he passed out.  The witnesses reported the event lasted ~1 minute and they were able to wake him by splashing cold water in his face.  He was disoriented upon awakening and does not recall the event.  He denies anything like this happening before but does endorse historical dizziness with position changes.  He denies any chest pain, SOB, or focal neurologic deficits.  He stated he drank two cups of coffee this AM with his medications--which includes a diuretic--and did not drink any water or ETOH at parade and feels that his lack of hydration in the hot weather could have been problematic.  Other pertinent medical hx as below:    * No surgery found *      Indwelling Lines/Drains at time of discharge:   Lines/Drains/Airways          No matching active lines, drains, or airways        Hospital Course:   Mr. Hall was admitted overnight for observation. Ultrasound of his carotid arteries demonstrated no significant stenosis.  ECHO showed normal cardiac function.  His orthostatic vital signs were assessed in am and were negative for orthostasis.  He ambulated in [place], felt well and was discharged home to follow up with PCP.     Consults:     Significant Diagnostic Studies: Labs:   Troponin   Recent Labs  Lab 02/19/17  0641   TROPONINI  0.012       Radiology: X-Ray: CXR: X-Ray Chest 1 View (CXR): No results found for this visit on 02/18/17. and X-Ray Chest PA and Lateral (CXR): No results found for this visit on 02/18/17.  Cardiac Graphics: Echocardiogram:   2D echo with color flow doppler:   Results for orders placed or performed during the hospital encounter of 02/18/17   2D echo with color flow doppler   Result Value Ref Range    EF 65 55 - 65    Mitral Valve Regurgitation MILD     Diastolic Dysfunction No     Aortic Valve Regurgitation MILD     Est. PA Systolic Pressure 24.16     Tricuspid Valve Regurgitation TRIVIAL TO MILD        Pending Diagnostic Studies:     None        Final Active Diagnoses:    Diagnosis Date Noted POA    PRINCIPAL PROBLEM:  Syncope [R55] 02/18/2017 Yes    Essential hypertension [I10] 02/19/2017 Yes    Coronary artery disease involving native coronary artery without angina pectoris [I25.10] 02/19/2017 Yes    HX: long term anticoagulant use [Z79.01] 02/19/2017 Not Applicable      Problems Resolved During this Admission:    Diagnosis Date Noted Date Resolved POA      * Syncope  Neuro-checks. Tele-monitoring. Reviewed TSH--normal range. Orthostatic negative. 2D Echocardiogram, as well as carotid U/S complete and normal result.    Risk assessment according to [place] Syncope Rule is as follows:  CHF history: no  Hematocrit < 30%: no  EKG abnormal (New ECG change from any source, any non-sinus rhythm on EKG or monitoring): no  Shortness of breath symptoms: no  Systolic BP <90 mmHg at Triage: no    Patient is at low risk of syncope.             Essential hypertension  Chronic, controlled on current regimen.  Monitor closely and titrate medications as required for sustained BP control.      Coronary artery disease involving native coronary artery without angina pectoris  Chronic, no anginal symptoms.  Troponins negative.  EKG without acute findings.  Continue ASA, Plavix, BBlockade, statin therapy.  Monitor on  telemetry.      HX: long term anticoagulant use  Hx DVT.  Continue warfarin therapy-- INR therapeutic at 2.5.  Daily INR.        Discharged Condition: good    Disposition: Home or Self Care    Follow Up:  Follow-up Information     Follow up with Travis Go MD In 1 week.    Specialty:  Internal Medicine    Why:  follow up of blood pressure    Contact information:    [address] [place] [address]  [phone]          Patient Instructions:     Diet general       Medications:  Reconciled Home Medications:   Discharge Medication List as of 2/19/2017  1:08 PM      CONTINUE these medications which have NOT CHANGED    Details   aspirin 81 MG Chew Take 81 mg by mouth once daily., Until Discontinued, Historical Med      azilsartan med-chlorthalidone (EDARBYCLOR) 40-12.5 mg Tab Take by mouth., Until Discontinued, Historical Med      clopidogrel (PLAVIX) 75 mg tablet Starting 10/6/2015, Until Discontinued, Historical Med      FLUZONE HIGH-DOSE 2015-16, PF, 180 mcg/0.5 mL Syrg Starting 10/17/2015, Until Discontinued, Historical Med      folic acid (FOLVITE) 1 MG tablet Starting 8/30/2015, Until Discontinued, Historical Med      metoprolol tartrate (LOPRESSOR) 25 MG tablet Starting 10/22/2015, Until Discontinued, Historical Med      pravastatin (PRAVACHOL) 80 MG tablet Starting 8/12/2015, Until Discontinued, Historical Med      tamsulosin (FLOMAX) 0.4 mg Cp24 Take 0.4 mg by mouth once daily., Until Discontinued, Historical Med      warfarin (COUMADIN) 4 MG tablet Starting 11/8/2015, Until Discontinued, Historical Med         STOP taking these medications       losartan (COZAAR) 100 MG tablet Comments:   Reason for Stopping:             Time spent on the discharge of patient: 30 minutes    Dagoberto Tovar MD  Department of Hospital Medicine  [facility]

## 2017-02-23 NOTE — ASSESSMENT & PLAN NOTE
Chronic, no anginal symptoms.  Troponins negative.  EKG without acute findings.  Continue ASA, Plavix, BBlockade, statin therapy.  Monitor on telemetry.

## 2017-09-25 ENCOUNTER — HOSPITAL ENCOUNTER (OUTPATIENT)
Facility: HOSPITAL | Age: 72
Discharge: HOME | End: 2017-09-27
Attending: EMERGENCY MEDICINE | Admitting: INTERNAL MEDICINE
Payer: MEDICARE

## 2017-09-25 DIAGNOSIS — Z92.29 HX: LONG TERM ANTICOAGULANT USE: ICD-10-CM

## 2017-09-25 DIAGNOSIS — I25.10 CORONARY ARTERY DISEASE INVOLVING NATIVE CORONARY ARTERY WITHOUT ANGINA PECTORIS, UNSPECIFIED WHETHER NATIVE OR TRANSPLANTED HEART: ICD-10-CM

## 2017-09-25 DIAGNOSIS — R07.9 CHEST PAIN, UNSPECIFIED: ICD-10-CM

## 2017-09-25 DIAGNOSIS — I10 UNCONTROLLED HYPERTENSION: Primary | ICD-10-CM

## 2017-09-25 DIAGNOSIS — I50.9 CHF (CONGESTIVE HEART FAILURE): ICD-10-CM

## 2017-09-25 LAB
ALBUMIN SERPL BCP-MCNC: 3.8 G/DL
ALP SERPL-CCNC: 62 U/L
ALT SERPL W/O P-5'-P-CCNC: 13 U/L
ANION GAP SERPL CALC-SCNC: 9 MMOL/L
AST SERPL-CCNC: 20 U/L
BASOPHILS # BLD AUTO: 0.1 K/UL
BASOPHILS NFR BLD: 1.2 %
BILIRUB SERPL-MCNC: 0.8 MG/DL
BNP SERPL-MCNC: 317 PG/ML
BUN SERPL-MCNC: 13 MG/DL
CALCIUM SERPL-MCNC: 9.3 MG/DL
CHLORIDE SERPL-SCNC: 108 MMOL/L
CO2 SERPL-SCNC: 24 MMOL/L
CREAT SERPL-MCNC: 1 MG/DL
DIFFERENTIAL METHOD: ABNORMAL
EOSINOPHIL # BLD AUTO: 0.3 K/UL
EOSINOPHIL NFR BLD: 2.8 %
ERYTHROCYTE [DISTWIDTH] IN BLOOD BY AUTOMATED COUNT: 14.8 %
EST. GFR  (AFRICAN AMERICAN): >60 ML/MIN/1.73 M^2
EST. GFR  (NON AFRICAN AMERICAN): >60 ML/MIN/1.73 M^2
GLUCOSE SERPL-MCNC: 86 MG/DL
HCT VFR BLD AUTO: 44 %
HGB BLD-MCNC: 14.6 G/DL
LYMPHOCYTES # BLD AUTO: 4.3 K/UL
LYMPHOCYTES NFR BLD: 46.3 %
MCH RBC QN AUTO: 29.8 PG
MCHC RBC AUTO-ENTMCNC: 33.2 G/DL
MCV RBC AUTO: 90 FL
MONOCYTES # BLD AUTO: 0.5 K/UL
MONOCYTES NFR BLD: 5.1 %
NEUTROPHILS # BLD AUTO: 4.1 K/UL
NEUTROPHILS NFR BLD: 44.6 %
PLATELET # BLD AUTO: 186 K/UL
PMV BLD AUTO: 8.9 FL
POTASSIUM SERPL-SCNC: 3.9 MMOL/L
PROT SERPL-MCNC: 7 G/DL
RBC # BLD AUTO: 4.9 M/UL
SODIUM SERPL-SCNC: 141 MMOL/L
TROPONIN I SERPL DL<=0.01 NG/ML-MCNC: <0.006 NG/ML
WBC # BLD AUTO: 9.2 K/UL

## 2017-09-25 PROCEDURE — 83880 ASSAY OF NATRIURETIC PEPTIDE: CPT

## 2017-09-25 PROCEDURE — 25000003 PHARM REV CODE 250: Performed by: EMERGENCY MEDICINE

## 2017-09-25 PROCEDURE — 84484 ASSAY OF TROPONIN QUANT: CPT

## 2017-09-25 PROCEDURE — 85025 COMPLETE CBC W/AUTO DIFF WBC: CPT

## 2017-09-25 PROCEDURE — 63600175 PHARM REV CODE 636 W HCPCS: Performed by: EMERGENCY MEDICINE

## 2017-09-25 PROCEDURE — 80053 COMPREHEN METABOLIC PANEL: CPT

## 2017-09-25 PROCEDURE — G0378 HOSPITAL OBSERVATION PER HR: HCPCS

## 2017-09-25 PROCEDURE — 96374 THER/PROPH/DIAG INJ IV PUSH: CPT

## 2017-09-25 PROCEDURE — 36415 COLL VENOUS BLD VENIPUNCTURE: CPT

## 2017-09-25 PROCEDURE — 99285 EMERGENCY DEPT VISIT HI MDM: CPT | Mod: 25

## 2017-09-25 RX ORDER — VALSARTAN 160 MG/1
320 TABLET ORAL DAILY
COMMUNITY
End: 2020-10-18

## 2017-09-25 RX ORDER — HYDRALAZINE HYDROCHLORIDE 20 MG/ML
10 INJECTION INTRAMUSCULAR; INTRAVENOUS
Status: COMPLETED | OUTPATIENT
Start: 2017-09-25 | End: 2017-09-25

## 2017-09-25 RX ORDER — SOTALOL HYDROCHLORIDE 120 MG/1
80 TABLET ORAL EVERY 12 HOURS
COMMUNITY
End: 2021-02-01

## 2017-09-25 RX ORDER — ASPIRIN 325 MG
325 TABLET ORAL
Status: COMPLETED | OUTPATIENT
Start: 2017-09-25 | End: 2017-09-25

## 2017-09-25 RX ADMIN — ASPIRIN 325 MG ORAL TABLET 325 MG: 325 PILL ORAL at 10:09

## 2017-09-25 RX ADMIN — HYDRALAZINE HYDROCHLORIDE 10 MG: 20 INJECTION INTRAMUSCULAR; INTRAVENOUS at 10:09

## 2017-09-26 PROBLEM — R07.9 CHEST PAIN: Status: ACTIVE | Noted: 2017-09-26

## 2017-09-26 LAB — TROPONIN I SERPL DL<=0.01 NG/ML-MCNC: <0.006 NG/ML

## 2017-09-26 PROCEDURE — 94761 N-INVAS EAR/PLS OXIMETRY MLT: CPT

## 2017-09-26 PROCEDURE — 99219 PR INITIAL OBSERVATION CARE,LEVL II: CPT | Mod: ,,, | Performed by: INTERNAL MEDICINE

## 2017-09-26 PROCEDURE — G0378 HOSPITAL OBSERVATION PER HR: HCPCS

## 2017-09-26 PROCEDURE — 25000003 PHARM REV CODE 250: Performed by: INTERNAL MEDICINE

## 2017-09-26 PROCEDURE — 36415 COLL VENOUS BLD VENIPUNCTURE: CPT

## 2017-09-26 PROCEDURE — 25000003 PHARM REV CODE 250: Performed by: SPECIALIST

## 2017-09-26 PROCEDURE — 93306 TTE W/DOPPLER COMPLETE: CPT

## 2017-09-26 PROCEDURE — 84484 ASSAY OF TROPONIN QUANT: CPT

## 2017-09-26 PROCEDURE — 93005 ELECTROCARDIOGRAM TRACING: CPT

## 2017-09-26 RX ORDER — SOTALOL HYDROCHLORIDE 80 MG/1
80 TABLET ORAL EVERY 12 HOURS
Status: DISCONTINUED | OUTPATIENT
Start: 2017-09-26 | End: 2017-09-27 | Stop reason: HOSPADM

## 2017-09-26 RX ORDER — AMOXICILLIN 250 MG
1 CAPSULE ORAL DAILY PRN
Status: DISCONTINUED | OUTPATIENT
Start: 2017-09-26 | End: 2017-09-27 | Stop reason: HOSPADM

## 2017-09-26 RX ORDER — CHLORTHALIDONE 25 MG/1
25 TABLET ORAL DAILY
Status: DISCONTINUED | OUTPATIENT
Start: 2017-09-26 | End: 2017-09-27 | Stop reason: HOSPADM

## 2017-09-26 RX ORDER — POTASSIUM CHLORIDE 20 MEQ/1
20 TABLET, EXTENDED RELEASE ORAL ONCE
Status: COMPLETED | OUTPATIENT
Start: 2017-09-26 | End: 2017-09-26

## 2017-09-26 RX ORDER — PRAVASTATIN SODIUM 40 MG/1
80 TABLET ORAL DAILY
Status: DISCONTINUED | OUTPATIENT
Start: 2017-09-26 | End: 2017-09-27 | Stop reason: HOSPADM

## 2017-09-26 RX ORDER — ACETAMINOPHEN 325 MG/1
650 TABLET ORAL EVERY 6 HOURS PRN
Status: DISCONTINUED | OUTPATIENT
Start: 2017-09-26 | End: 2017-09-27 | Stop reason: HOSPADM

## 2017-09-26 RX ORDER — NAPROXEN SODIUM 220 MG/1
81 TABLET, FILM COATED ORAL DAILY
Status: DISCONTINUED | OUTPATIENT
Start: 2017-09-26 | End: 2017-09-27 | Stop reason: HOSPADM

## 2017-09-26 RX ORDER — VALSARTAN 80 MG/1
320 TABLET ORAL DAILY
Status: DISCONTINUED | OUTPATIENT
Start: 2017-09-26 | End: 2017-09-27 | Stop reason: HOSPADM

## 2017-09-26 RX ORDER — TAMSULOSIN HYDROCHLORIDE 0.4 MG/1
0.4 CAPSULE ORAL DAILY
Status: DISCONTINUED | OUTPATIENT
Start: 2017-09-26 | End: 2017-09-27 | Stop reason: HOSPADM

## 2017-09-26 RX ORDER — ONDANSETRON 2 MG/ML
4 INJECTION INTRAMUSCULAR; INTRAVENOUS EVERY 8 HOURS PRN
Status: DISCONTINUED | OUTPATIENT
Start: 2017-09-26 | End: 2017-09-27 | Stop reason: HOSPADM

## 2017-09-26 RX ORDER — FOLIC ACID 1 MG/1
1 TABLET ORAL DAILY
Status: DISCONTINUED | OUTPATIENT
Start: 2017-09-26 | End: 2017-09-27 | Stop reason: HOSPADM

## 2017-09-26 RX ADMIN — APIXABAN 5 MG: 2.5 TABLET, FILM COATED ORAL at 01:09

## 2017-09-26 RX ADMIN — FOLIC ACID 1 MG: 1 TABLET ORAL at 01:09

## 2017-09-26 RX ADMIN — CHLORTHALIDONE 25 MG: 25 TABLET ORAL at 01:09

## 2017-09-26 RX ADMIN — POTASSIUM CHLORIDE 20 MEQ: 1500 TABLET, EXTENDED RELEASE ORAL at 01:09

## 2017-09-26 RX ADMIN — SOTALOL HYDROCHLORIDE 80 MG: 80 TABLET ORAL at 01:09

## 2017-09-26 RX ADMIN — VALSARTAN 320 MG: 80 TABLET ORAL at 01:09

## 2017-09-26 RX ADMIN — PRAVASTATIN SODIUM 80 MG: 40 TABLET ORAL at 01:09

## 2017-09-26 RX ADMIN — ASPIRIN 81 MG CHEWABLE TABLET 81 MG: 81 TABLET CHEWABLE at 01:09

## 2017-09-26 RX ADMIN — TAMSULOSIN HYDROCHLORIDE 0.4 MG: 0.4 CAPSULE ORAL at 01:09

## 2017-09-26 RX ADMIN — SOTALOL HYDROCHLORIDE 80 MG: 80 TABLET ORAL at 09:09

## 2017-09-26 RX ADMIN — APIXABAN 5 MG: 2.5 TABLET, FILM COATED ORAL at 09:09

## 2017-09-26 NOTE — CONSULTS
Dictated.  Accelerated HTN; chest tightness.  ?some PND; BNP levated; rpt echo.  Kalyn stress in am.   Add Chlorthalidone for HTN

## 2017-09-26 NOTE — PLAN OF CARE
09/26/17 0839   Patient Assessment/Suction   Level of Consciousness (AVPU) alert   PRE-TX-O2-ETCO2   O2 Device (Oxygen Therapy) room air   SpO2 98 %   Pulse Oximetry Type Intermittent   $ Pulse Oximetry - Multiple Charge Pulse Oximetry - Multiple

## 2017-09-26 NOTE — ED PROVIDER NOTES
" Encounter Date: 9/25/2017    SCRIBE #1 NOTE: I, Camila Baxter , am scribing for, and in the presence of,  Dr. Castle . I have scribed the entire note.       History     Chief Complaint   Patient presents with    Chest Pain     saw MD last week double BP pills, BP has been up and down, felt chest tightness tonight         09/25/2017  10:05 PM     Chief Complaint: CP      The patient is a 72 y.o. male who is presenting with the acute onset of episodic chest tightness that occurred last night and began again x 1 hour PTA. The pt reports that he feels "tightness" across chest without any exacerbating or mitigating factors. The pt endorses associated lightheadedness and recent systolic pressures recorded in the 200's despite increasing dosages of BP medication. The pt denies recent diaphoresis, emesis, nausea, changes in vision, or numbness. Pertinent PMHx includes long term anticoagulant use, clotting disorder, CAD, and HTN. Pertinent PMHx includes CABG and cholecystectomy.               The history is provided by the patient and medical records.     Review of patient's allergies indicates:  No Known Allergies  Past Medical History:   Diagnosis Date    Anticoagulant long-term use     Clotting disorder     Coronary artery disease     Encounter for blood transfusion     Hypertension      Past Surgical History:   Procedure Laterality Date    CHOLECYSTECTOMY      CORONARY ARTERY BYPASS GRAFT      knee surgery ,back surgery(bilateral)       No family history on file.  Social History   Substance Use Topics    Smoking status: Former Smoker     Quit date: 1/1/1980    Smokeless tobacco: Not on file      Comment: smoked pipe    Alcohol use 0.0 oz/week      Comment: socially     Review of Systems   Respiratory: Positive for chest tightness.    Neurological: Positive for dizziness.   All other systems reviewed and are negative.      Physical Exam     Initial Vitals [09/25/17 2200]   BP Pulse Resp Temp SpO2   (!) " 221/109 (!) 56 18 96.8 °F (36 °C) 97 %      MAP       146.33         Physical Exam    Nursing note and vitals reviewed.  Constitutional: He appears well-developed and well-nourished.   HENT:   Head: Normocephalic and atraumatic.   Mouth/Throat: Oropharynx is clear and moist.   Eyes: EOM are normal. Pupils are equal, round, and reactive to light.   Neck: Normal range of motion. Neck supple.   Cardiovascular: Normal rate, regular rhythm, normal heart sounds and intact distal pulses. Exam reveals no gallop and no friction rub.    No murmur heard.  Pulmonary/Chest: Breath sounds normal. He has no wheezes. He has no rhonchi. He has no rales.   CABG present    Abdominal: Soft. He exhibits no distension. There is no tenderness.   Musculoskeletal: Normal range of motion. He exhibits no edema or tenderness.   Lymphadenopathy:     He has no cervical adenopathy.   Neurological: He is alert and oriented to person, place, and time. He has normal strength. No sensory deficit.   Skin: Skin is warm and dry. Capillary refill takes less than 2 seconds.         ED Course   Procedures  Labs Reviewed   CBC W/ AUTO DIFFERENTIAL - Abnormal; Notable for the following:        Result Value    RDW 14.8 (*)     MPV 8.9 (*)     All other components within normal limits   B-TYPE NATRIURETIC PEPTIDE - Abnormal; Notable for the following:      (*)     All other components within normal limits   COMPREHENSIVE METABOLIC PANEL   TROPONIN I   TROPONIN I             Medical Decision Making:   History:   I obtained history from: someone other than patient.  Old Medical Records: I decided to obtain old medical records.  Clinical Tests:   Lab Tests: Reviewed and Ordered  Radiological Study: Reviewed and Ordered  Medical Tests: Reviewed and Ordered            Scribe Attestation:   Scribe #1: I performed the above scribed service and the documentation accurately describes the services I performed. I attest to the accuracy of the note.    Attending  Attestation:           Physician Attestation for Scribe:  Physician Attestation Statement for Scribe #1: I, Dr. Castle , reviewed documentation, as scribed by Camila Baxter  in my presence, and it is both accurate and complete.                 ED Course as of Sep 25 2318   Mon Sep 25, 2017   2211 Sinus bradycardia 56 bpm left axis no ST elevation or depression or T wave inversion.  [EF]      ED Course User Index  [EF] Brennen Castle MD     Clinical Impression:   The primary encounter diagnosis was Uncontrolled hypertension. A diagnosis of Chest pain, unspecified was also pertinent to this visit.            72-year-old man with a CABG presents with chest tightness for 2 nights.  Blood pressure significantly elevated at home, chest pain resolved in the ER with blood pressure control.  Case discussed with hospital medicine lola moore for admit. no CP at dispo.  I do not suspect PE or dissection.               Brennen Castle MD  09/25/17 7243

## 2017-09-26 NOTE — PLAN OF CARE
The pt lives at home with his wife, Pat [phone]. He reports independence at home and denies HH or DME. He uses [facility] on [address] He sees Dr. Gaffney and has Medicare and [facility]. He has no questions or concerns at this time. I educated him on the blue discharge folder and wrote my name and number on the pts white board. Olga Roberson [facility]     09/26/17 1156   Discharge Assessment   Assessment Type Discharge Planning Assessment   Confirmed/corrected address and phone number on facesheet? Yes   Assessment information obtained from? Patient   Communicated expected length of stay with patient/caregiver no   Prior to hospitilization cognitive status: Alert/Oriented   Prior to hospitalization functional status: Independent   Current cognitive status: Alert/Oriented   Current Functional Status: Independent   Lives With spouse   Able to Return to Prior Arrangements yes   Is patient able to care for self after discharge? Yes   Readmission Within The Last 30 Days no previous admission in last 30 days   Patient currently being followed by outpatient case management? No   Patient currently receives any other outside agency services? No   Equipment Currently Used at Home none   Do you have any problems affording any of your prescribed medications? No   Is the patient taking medications as prescribed? yes   Does the patient have transportation home? Yes   Transportation Available car;family or friend will provide   Does the patient receive services at the Coumadin Clinic? No   Discharge Plan A Home   Discharge Plan B Home with family   Patient/Family In Agreement With Plan yes

## 2017-09-26 NOTE — ED NOTES
Assumed care:  Patient awake, alert and oriented x 3, skin warm and dry, in NAD with family at bedside.    Patient identifiers for Jeremias Hall checked and correct.  LOC:  Patient is awake, alert, and aware of environment with an appropriate affect. Patient is oriented x 3 and speaking appropriately.  APPEARANCE:  Patient resting comfortably and in no acute distress. Patient is clean and well groomed, patient's clothing is properly fastened.  SKIN:  The skin is warm and dry. Patient has normal skin turgor and moist mucus membranes. Skin is intact; no bruising or breakdown noted.  MUSCULOSKELETAL:  Patient is moving all extremities well, no obvious deformities noted. Pulses intact.   RESPIRATORY:  Airway is open and patent. Respirations are spontaneous and non-labored with normal effort and rate.  CARDIAC:  Patient has a bradycardia. No peripheral edema noted. Capillary refill < 3 seconds.  CO chest pressure  ABDOMEN:  No distention noted.  Soft and non-tender upon palpation.  NEUROLOGICAL:  PERRL. Facial expression is symmetrical. Hand grasps are equal bilaterally. Normal sensation in all extremities when touched with finger.  Allergies reported:  Review of patient's allergies indicates:  No Known Allergies  OTHER NOTES:  CO chest pressure, denies SOB or nausea.  History of bypass.

## 2017-09-26 NOTE — H&P
"PCP: Travis Go MD    History & Physical    Chief Complaint: Chest pain    History of Present Illness:  Patient is a 72 y.o. male admitted to Hospitalist Service from [facility] Room with complaint of chest pain for 2 days. Patient reportedly has past medical history significant for hypertension, CAD, long-term Eliquis use. Patient presented with acute onset of episodic chest tightness that occurred last night and began again x 1 hour PTA. The pt reports that he feels "tightness" across chest without any exacerbating or mitigating factors. The pt endorses associated lightheadedness and recent systolic pressures recorded in the 200's despite increasing dosages of BP medication. The pt denies recent diaphoresis, emesis, nausea, changes in vision, or numbness. Patient denied shortness of breath, abdominal pain, nausea, vomiting, headache, vision changes, focal neuro-deficits, cough or fever.    Past Medical History:   Diagnosis Date    Anticoagulant long-term use     Clotting disorder     Coronary artery disease     Encounter for blood transfusion     Hypertension      Past Surgical History:   Procedure Laterality Date    CHOLECYSTECTOMY      CORONARY ARTERY BYPASS GRAFT      knee surgery ,back surgery(bilateral)       History reviewed. No pertinent family history.  Social History   Substance Use Topics    Smoking status: Former Smoker     Quit date: 1/1/1980    Smokeless tobacco: Never Used      Comment: smoked pipe    Alcohol use 0.0 oz/week      Comment: socially      Review of patient's allergies indicates:  No Known Allergies  PTA Medications   Medication Sig    apixaban (ELIQUIS) 5 mg Tab Take 5 mg by mouth 2 (two) times daily.    aspirin 81 MG Chew Take 81 mg by mouth once daily.    azilsartan med-chlorthalidone (EDARBYCLOR) 40-12.5 mg Tab Take by mouth.    FLUZONE HIGH-DOSE 2015-16, PF, 180 mcg/0.5 mL Syrg     folic acid (FOLVITE) 1 MG tablet     metoprolol " tartrate (LOPRESSOR) 25 MG tablet     pravastatin (PRAVACHOL) 80 MG tablet     sotalol (BETAPACE) 120 MG Tab Take 80 mg by mouth every 12 (twelve) hours.    tamsulosin (FLOMAX) 0.4 mg Cp24 Take 0.4 mg by mouth once daily.    valsartan (DIOVAN) 160 MG tablet Take 320 mg by mouth once daily.     Review of Systems:  Constitutional: no fever or chills. + Dizziness  Eyes: no visual changes  Ears, nose, mouth, throat, and face: no nasal congestion or sore throat  Respiratory: no cough or shorness of breath  Cardiovascular: + CP  Gastrointestinal: no nausea or vomiting, no abdominal pain or change in bowel habits  Genitourinary: no hematuria or dysuria  Integument/breast: no rash or pruritis  Hematologic/lymphatic: no easy bruising or lymphadenopathy  Musculoskeletal: no arthralgias or myalgias  Neurological: no seizures or tremors.  Behavioral/Psych: no auditory or visual hallucinations  Endocrine: no heat or cold intolerance     OBJECTIVE:     Vital Signs (Most Recent)  Temp: 97.6 °F (36.4 °C) (09/26/17 0757)  Pulse: (!) 54 (09/26/17 0757)  Resp: 18 (09/26/17 0757)  BP: 136/67 (09/26/17 0757)  SpO2: 98 % (09/26/17 0839)    Physical Exam:  General appearance: well developed, appears stated age  Head: normocephalic, atraumatic  Eyes:  conjunctivae/corneas clear. PERRL.  Nose: Nares normal. Septum midline.  Throat: lips, mucosa, and tongue normal; teeth and gums normal, no throat erythema.  Neck: supple, symmetrical, trachea midline, no JVD and thyroid not enlarged, symmetric, no tenderness/mass/nodules  Lungs:  clear to auscultation bilaterally and normal respiratory effort  Chest wall: no tenderness  Heart: regular rate and rhythm, S1, S2 normal, no murmur, click, rub or gallop  Abdomen: soft, non-tender non-distented; bowel sounds normal; no masses,  no organomegaly  Extremities: no cyanosis, clubbing or edema.   Pulses: 2+ and symmetric  Skin: Skin color, texture, turgor normal. No rashes or lesions.  Lymph nodes:  Cervical, supraclavicular, and axillary nodes normal.  Neurologic: Normal strength and tone. No focal numbness or weakness. CNII-XII intact.      Laboratory:   CBC:   Recent Labs  Lab 09/25/17  2220   WBC 9.20   RBC 4.90   HGB 14.6   HCT 44.0      MCV 90   MCH 29.8   MCHC 33.2     CMP:   Recent Labs  Lab 09/25/17  2220   GLU 86   CALCIUM 9.3   ALBUMIN 3.8   PROT 7.0      K 3.9   CO2 24      BUN 13   CREATININE 1.0   ALKPHOS 62   ALT 13   AST 20   BILITOT 0.8     Cardiac markers:   Recent Labs  Lab 09/26/17  0014   TROPONINI <0.006     BNP: 317    Diagnostic Results:  Chest X-Ray: Probable left lower lobe infiltrate. Possible infiltrate at the right lung base. Prior sternotomy. Loop recorder noted    Assessment/Plan:     Active Hospital Problems    Diagnosis  POA    *Chest pain [R07.9]  Supplemental O2 via nasal canula; titrate O2 saturation to >92%.  Serial Cardiac enzymes × 3.  Tele-monitoring.   Cardiology Consultation.  Check fasting lipid panel and EKG in AM.  Use Nitroglycerine PRN Chest pain.  Na restriction <2g/d.  Patient does not have any pneumonia symptoms.    Yes    Uncontrolled hypertension [I10]  Chronic problem. Will continue chronic medications and monitor for any changes, adjusting as needed.  Chlorthalidone added.    Yes    Coronary artery disease involving native coronary artery without angina pectoris [I25.10]  Patient with known CAD and monitor for S/Sx of angina/ACS. Continue to monitor on telemetry.    Yes    HX: long term anticoagulant use [Z79.01]  History of left leg DVT  On Eliquis.  Not Applicable        VTE Risk Mitigation         Ordered     apixaban tablet 5 mg  2 times daily     Route:  Oral        09/26/17 1055     Low Risk of VTE  Once      09/26/17 0012        Alicia Coy MD  Department of Hospital Medicine   [facility]

## 2017-09-26 NOTE — PLAN OF CARE
Problem: Patient Care Overview  Goal: Plan of Care Review  Outcome: Ongoing (interventions implemented as appropriate)  Pt oriented to room and unit routine. Spouse at bedside. Pt denies pain at this time.

## 2017-09-26 NOTE — PLAN OF CARE
Problem: Patient Care Overview  Goal: Plan of Care Review  Outcome: Ongoing (interventions implemented as appropriate)  Pt. Ambulates well to restroom. Denies any chest pain/pressure. BP still slightly elevated but stable compared to last PM. Heart rate remains 40-50. Cardiologist aware. Pt  Asymptomatic.

## 2017-09-27 VITALS
DIASTOLIC BLOOD PRESSURE: 70 MMHG | SYSTOLIC BLOOD PRESSURE: 132 MMHG | TEMPERATURE: 98 F | HEART RATE: 55 BPM | BODY MASS INDEX: 34.4 KG/M2 | OXYGEN SATURATION: 98 % | HEIGHT: 70 IN | WEIGHT: 240.31 LBS | RESPIRATION RATE: 18 BRPM

## 2017-09-27 LAB
AORTIC VALVE REGURGITATION: NORMAL
DIASTOLIC DYSFUNCTION: NO
DIASTOLIC DYSFUNCTION: NO
ESTIMATED PA SYSTOLIC PRESSURE: 20.14
MITRAL VALVE REGURGITATION: NORMAL
RETIRED EF AND QEF - SEE NOTES: 63 (ref 55–65)
TRICUSPID VALVE REGURGITATION: NORMAL

## 2017-09-27 PROCEDURE — G0378 HOSPITAL OBSERVATION PER HR: HCPCS

## 2017-09-27 PROCEDURE — 94761 N-INVAS EAR/PLS OXIMETRY MLT: CPT

## 2017-09-27 PROCEDURE — 94760 N-INVAS EAR/PLS OXIMETRY 1: CPT

## 2017-09-27 PROCEDURE — 25000003 PHARM REV CODE 250: Performed by: SPECIALIST

## 2017-09-27 PROCEDURE — 99217 PR OBSERVATION CARE DISCHARGE: CPT | Mod: ,,, | Performed by: INTERNAL MEDICINE

## 2017-09-27 PROCEDURE — 63600175 PHARM REV CODE 636 W HCPCS

## 2017-09-27 PROCEDURE — 25000003 PHARM REV CODE 250: Performed by: INTERNAL MEDICINE

## 2017-09-27 RX ORDER — REGADENOSON 0.08 MG/ML
INJECTION, SOLUTION INTRAVENOUS
Status: DISCONTINUED
Start: 2017-09-27 | End: 2017-09-27 | Stop reason: HOSPADM

## 2017-09-27 RX ADMIN — APIXABAN 5 MG: 2.5 TABLET, FILM COATED ORAL at 12:09

## 2017-09-27 RX ADMIN — TAMSULOSIN HYDROCHLORIDE 0.4 MG: 0.4 CAPSULE ORAL at 12:09

## 2017-09-27 RX ADMIN — CHLORTHALIDONE 25 MG: 25 TABLET ORAL at 12:09

## 2017-09-27 RX ADMIN — ASPIRIN 81 MG CHEWABLE TABLET 81 MG: 81 TABLET CHEWABLE at 12:09

## 2017-09-27 RX ADMIN — FOLIC ACID 1 MG: 1 TABLET ORAL at 12:09

## 2017-09-27 RX ADMIN — PRAVASTATIN SODIUM 80 MG: 40 TABLET ORAL at 12:09

## 2017-09-27 RX ADMIN — VALSARTAN 320 MG: 80 TABLET ORAL at 12:09

## 2017-09-27 RX ADMIN — SOTALOL HYDROCHLORIDE 80 MG: 80 TABLET ORAL at 12:09

## 2017-09-27 NOTE — PROGRESS NOTES
Progress Note  Cardiology    Admit Date: 9/25/2017   LOS: 0 days     Follow-up For: HTN; CHEST PAIN    Scheduled Meds:   apixaban  5 mg Oral BID    aspirin  81 mg Oral Daily    chlorthalidone  25 mg Oral Daily    folic acid  1 mg Oral Daily    pravastatin  80 mg Oral Daily    regadenoson        sotalol  80 mg Oral Q12H    tamsulosin  0.4 mg Oral Daily    valsartan  320 mg Oral Daily     Continuous Infusions:   PRN Meds:acetaminophen, ondansetron, senna-docusate 8.6-50 mg    Review of patient's allergies indicates:  No Known Allergies    SUBJECTIVE:     Interval History: Patient has no complaint of cp or sob.    Review of Systems  Respiratory: negative for cough, hemoptysis, sputum and wheezing  Cardiovascular: negative for chest pressure/discomfort, claudication, orthopnea, palpitations and paroxysmal nocturnal dyspnea    OBJECTIVE:     Vital Signs (Most Recent)  Temp: 97.9 °F (36.6 °C) (09/27/17 0717)  Pulse: (!) 50 (09/27/17 0717)  Resp: 18 (09/27/17 0717)  BP: 134/82 (09/27/17 0717)  SpO2: 96 % (09/27/17 0907)    Vital Signs Range (Last 24H):  Temp:  [96.8 °F (36 °C)-97.9 °F (36.6 °C)]   Pulse:  [45-58]   Resp:  [17-20]   BP: (107-162)/(69-88)   SpO2:  [93 %-97 %]       Physical Exam:  Neck: no carotid bruit, no JVD and supple, symmetrical, trachea midline  Lungs: clear to auscultation bilaterally, normal respiratory effort  Heart: regular rate and rhythm, S1, S2 normal, no murmur, click, rub or gallop  Abdomen: soft, non-tender; bowel sounds normal; no masses,  no organomegaly  Extremities: Positive for: edema Trace and pitting bilaterally    No results found for this or any previous visit (from the past 24 hour(s)).    Diagnostic Results:  ECG: Reviewed    ASSESSMENT/PLAN:     BP better controlled. Chlorthalidone 12.5 mg /d. SPECT report pending.

## 2017-09-27 NOTE — CONSULTS
"HISTORY OF PRESENT ILLNESS:  This 72-year-old white gentleman is admitted with   accelerated hypertension and chest tightness.    The patient was evaluated by Dr. Gaffney last week when his blood pressure was   elevated in the 160+ range.  Valsartan was increased from 160 a day to 320 mg a   day.  The patient reports that his blood pressure was elevated on 09/24/2017 in   the evening in the 220/110 range; there was some associated anterior chest   tightness that lasted for approximately an hour or so associated with belching;   there was no nausea, vomiting, water brash, diaphoresis, discomfort in the   throat, shoulders or down the arms.  The patient was asymptomatic through the   day until 09/25/2017 in the evening, again when his blood pressure was elevated   and there was recurrence of chest tightness.  The patient is asymptomatic at   this time.  He reports that his exercise capacity is good; he is able to   ambulate at a brisk pace for around 20 minutes without any chest pain or   tightness.  There is no history of nitroglycerin use.    The patient had a stent implanted by Dr. Reyna; he apparently had five vessel   emergent coronary artery bypass graft surgery after the procedure due to   "rupture" vessel.  Over the last month, the patient reports possible paroxysmal   nocturnal dyspnea; he does not have to sit up; however, he takes a few deep   breaths and the shortness of breath apparently resolves.  The patient was   admitted here for syncopal spell on 02/2017.  He subsequently had a loop   recorder implanted by Dr. Pimentel at [facility].  He was started on   sotalol at that admit; it is unclear if the patient had atrial fibrillation.    The patient had an echocardiogram during his 02/27/2017 admit; LVEF was 65%;   mild left ventricular hypertrophy, mild mitral regurgitation and mild aortic   regurgitation.    PAST HISTORY:  The patient has a history of left lower extremity DVT; the patient has been " on   Coumadin; he was switched to atelectasis b.i.d. at [facility] after the   loop recorder implant. Discectomy. Bilateral cataract extraction. Colon polyps 4 years ago.    Hypertension; dyslipidemia.  Cholecystectomy.  Back surgery.  Knee surgery.    SOCIAL HISTORY:  The patient used to smoke a pipe; he stopped smoking 45 years   ago, he does not drink any alcohol.  He lives with his wife.    FAMILY HISTORY:  Unremarkable.    REVIEW OF SYSTEMS:  The patient reports that his weight has increased of late;   possibly due to decreased physical activity.  He denies cough, wheezing or   asthma.  He denies hematochezia, melena or hematemesis.  He has mild leg edema.    MEDICATIONS:  Eliquis 5 mg b.i.d., aspirin 81 mg daily, folic acid 1 mg daily,   pravastatin 80 mg q.p.m., sotalol 120 mg every 12 hours, tamsulosin 0.4 mg Cp24   daily, valsartan 320 mg daily.    PHYSICAL EXAMINATION:  GENERAL:  This is a well-built white gentleman in no acute distress.  VITAL SIGNS:  Blood pressure 147/77, 20 per minute, 56 per minute, 96.8 degrees.    Blood pressure at admit was elevated up to 235/110.  EYES:  No conjunctival pallor or scleral icterus.  THROAT:  No masses are observed.  NECK:  Carotids equal without bruits.  There is no jugular venous distention or   thyromegaly.  CHEST:  Air entry is equal bilaterally.  There were no rales or rhonchi.  HEART:  S1, S2 are well heard.  There were no murmurs, gallops or rubs.  ABDOMEN:  Soft.  Vague tenderness noted in the epigastrium.  The liver edge is   not definitely palpable.  EXTREMITIES:  There is no clubbing, cyanosis or edema.    ECG reveals sinus bradycardia at 56 BPM, left axis deviation, left anterior   hemiblock.  There is poor R-wave progression in the lateral precordial leads.    Chest x-ray was reviewed.  There is vague hazy infiltrate in the left lower lung   zone.  Hemoglobin 14.6, hematocrit 44.0, WBC count is 9200; platelet count   186,000, granulocytes 45%,  lymphocytes 46%.  Sodium 141, potassium 3.9, BUN 13,   creatinine 1.0, total protein 7.0, albumin 3.8, AST and ALT within normal   limits.  Troponin less than 0.006 x2.  BNP is 317.    IMPRESSION:  1.  Accelerated hypertension; chest pain; myocardial infarction ruled out.  2.  Syncope February 2017; loop recorder implant February or March 2017;   probable atrial fibrillation; sotalol.  3.  Failed PCI and emergent 5-vessel coronary artery bypass graft surgery 5   years ago.  4.  DVT; hypercoagulable disorder; Eliquis therapy.    The patient will have an echocardiogram performed.  He will be started on   hydrochlorothiazide 25 mg daily.  Hopefully, this will control his blood   pressure better.  He will be ambulated in the gould.  He will probably require a   Lexiscan Myoview stress test for further evaluation.      MT/MIGUELITO  dd: 09/26/2017 12:51:31 (CDT)  td: 09/27/2017 01:47:19 (CDT)  Doc ID   #[id number]  Job ID #[id number]    CC: Travis Go M.D.

## 2017-09-27 NOTE — DISCHARGE SUMMARY
"Discharge Summary  Hospital Medicine    Admit Date: 9/25/2017    Date and Time: 9/27/20177:25 AM    Discharge Attending Physician: Alicia Cyo MD    Primary Care Physician: Travis Go MD    Diagnoses:  Active Hospital Problems    Diagnosis  POA    *Chest pain [R07.9]  Yes    Uncontrolled hypertension [I10]  Yes    Coronary artery disease involving native coronary artery without angina pectoris [I25.10]  Yes    HX: long term anticoagulant use [Z79.01]  Not Applicable      Resolved Hospital Problems    Diagnosis Date Resolved POA   No resolved problems to display.     Discharged Condition: Good    Hospital Course:   Patient is a 72 y.o. male admitted to Hospitalist Service from [facility] Room with complaint of chest pain for 2 days. Patient reportedly has past medical history significant for hypertension, CAD, long-term Eliquis use. Patient presented with acute onset of episodic chest tightness that occurred last night and began again x 1 hour PTA. The pt reported that he felt "tightness" across chest without any exacerbating or mitigating factors. The pt endorsed associated lightheadedness and recent systolic pressures recorded in the 200's despite increasing dosages of BP medication. The pt denied recent diaphoresis, emesis, nausea, changes in vision, or numbness. Patient denied shortness of breath, abdominal pain, nausea, vomiting, headache, vision changes, focal neuro-deficits, cough or fever. Patient was admitted to Hospitalist medicine service. Patient was evaluated by Dr. ZEYAD Braga. Blood pressure improved. Patient was monitored on telemetry medical floor, serial cardiac enzymes remained negative. Patient was evaluated by cardiologist and had further cardiac workup as mentioned below, which was negative for acute ischemia. Patient's symptoms resolved. Patient was discharged home in stable condition with following discharge plan of care.     Consults: Dr. JEFFERS" Omi    Significant Diagnostic Studies:   Chest X-Ray: Probable left lower lobe infiltrate. Possible infiltrate at the right lung base. Prior sternotomy. Loop recorder noted    Lexiscan: Negative myocardial stress and rest perfusion scan.    Microbiology Results (last 7 days)     ** No results found for the last 168 hours. **        Special Treatments/Procedures: None  Disposition: Home or Self Care    Medications:  Reconciled Home Medications: Current Discharge Medication List      CONTINUE these medications which have NOT CHANGED    Details   apixaban (ELIQUIS) 5 mg Tab Take 5 mg by mouth 2 (two) times daily.      aspirin 81 MG Chew Take 81 mg by mouth once daily.      azilsartan med-chlorthalidone (EDARBYCLOR) 40-12.5 mg Tab Take by mouth.      FLUZONE HIGH-DOSE 2015-16, PF, 180 mcg/0.5 mL Syrg Refills: 0      folic acid (FOLVITE) 1 MG tablet Refills: 2      pravastatin (PRAVACHOL) 80 MG tablet Refills: 2      sotalol (BETAPACE) 120 MG Tab Take 80 mg by mouth every 12 (twelve) hours.      tamsulosin (FLOMAX) 0.4 mg Cp24 Take 0.4 mg by mouth once daily.      valsartan (DIOVAN) 160 MG tablet Take 320 mg by mouth once daily.         STOP taking these medications       metoprolol tartrate (LOPRESSOR) 25 MG tablet Comments:   Reason for Stopping:               Discharge Procedure Orders  Diet general   Order Comments: Cardiac/ 2 gram sodium low cholesterol diet     Diet Diabetic 1800 Calories     Other restrictions (specify):   Order Comments: Fall precautions     Call MD for:   Order Comments: For worsening symptoms, chest pain, shortness of breath, increased abdominal pain, high grade fever, stroke or stroke like symptoms, immediately go to the nearest Emergency Room or call 911 as soon as possible.       Follow-up Information     Travis Go MD In 1 week.    Specialty:  Internal Medicine  Contact information:  [address]  [place] [address] [phone]             Tosin Braga MD In 2  weeks.    Specialty:  Cardiology  Contact information:  [address]  [address]  [place] [address] [phone]

## 2017-09-27 NOTE — PROGRESS NOTES
PATIENT ALERT AND ORIENTED.  SINUS TROY RHYTHM.  MONITORING LAB VALUES.  NPO SINCE MIDNIGHT FOR STRESS TEST THIS AM.  FULL CODE.  CALL LIGHT IN REACH.  BED IN LOW/LOCKED POSITION.

## 2017-09-27 NOTE — NURSING
Pt to be discharged home in stable condition. Denies chest pain and SOB. IV DC'ed, catheter intact. Tele box returned. Discharge instructions reviewed with pt, understanding verbalized.

## 2017-09-27 NOTE — PLAN OF CARE
09/27/17 0907   Patient Assessment/Suction   Level of Consciousness (AVPU) alert   PRE-TX-O2-ETCO2   O2 Device (Oxygen Therapy) room air   SpO2 96 %   Pulse Oximetry Type Intermittent   $ Pulse Oximetry - Multiple Charge Pulse Oximetry - Multiple

## 2017-09-28 NOTE — PLAN OF CARE
09/28/17 0754   Final Note   Assessment Type Final Discharge Note   Discharge Disposition Home   Hospital Follow Up  Appt(s) scheduled? Yes   Discharge plans and expectations educations in teach back method with documentation complete? Yes

## 2020-10-18 ENCOUNTER — HOSPITAL ENCOUNTER (OUTPATIENT)
Facility: HOSPITAL | Age: 75
Discharge: HOME | End: 2020-10-20
Attending: EMERGENCY MEDICINE | Admitting: INTERNAL MEDICINE
Payer: MEDICARE

## 2020-10-18 DIAGNOSIS — G45.9 TIA (TRANSIENT ISCHEMIC ATTACK): Primary | ICD-10-CM

## 2020-10-18 DIAGNOSIS — M79.603 ARM PAIN: ICD-10-CM

## 2020-10-18 DIAGNOSIS — R20.2 PARESTHESIA OF ARM: ICD-10-CM

## 2020-10-18 PROBLEM — Z79.01 CURRENT USE OF LONG TERM ANTICOAGULATION: Status: ACTIVE | Noted: 2020-10-18

## 2020-10-18 PROBLEM — E78.5 HLD (HYPERLIPIDEMIA): Status: ACTIVE | Noted: 2020-10-18

## 2020-10-18 PROBLEM — R79.89 ELEVATED BRAIN NATRIURETIC PEPTIDE (BNP) LEVEL: Status: ACTIVE | Noted: 2020-10-18

## 2020-10-18 LAB
ALBUMIN SERPL BCP-MCNC: 3.9 G/DL (ref 3.5–5.2)
ALP SERPL-CCNC: 63 U/L (ref 55–135)
ALT SERPL W/O P-5'-P-CCNC: 17 U/L (ref 10–44)
ANION GAP SERPL CALC-SCNC: 10 MMOL/L (ref 8–16)
AST SERPL-CCNC: 20 U/L (ref 10–40)
BASOPHILS # BLD AUTO: 0.05 K/UL (ref 0–0.2)
BASOPHILS NFR BLD: 0.5 % (ref 0–1.9)
BILIRUB SERPL-MCNC: 0.7 MG/DL (ref 0.1–1)
BNP SERPL-MCNC: 130 PG/ML (ref 0–99)
BUN SERPL-MCNC: 14 MG/DL (ref 8–23)
CALCIUM SERPL-MCNC: 9.3 MG/DL (ref 8.7–10.5)
CHLORIDE SERPL-SCNC: 103 MMOL/L (ref 95–110)
CO2 SERPL-SCNC: 27 MMOL/L (ref 23–29)
CREAT SERPL-MCNC: 1.2 MG/DL (ref 0.5–1.4)
DIFFERENTIAL METHOD: ABNORMAL
EOSINOPHIL # BLD AUTO: 0.3 K/UL (ref 0–0.5)
EOSINOPHIL NFR BLD: 2.8 % (ref 0–8)
ERYTHROCYTE [DISTWIDTH] IN BLOOD BY AUTOMATED COUNT: 13.2 % (ref 11.5–14.5)
EST. GFR  (AFRICAN AMERICAN): >60 ML/MIN/1.73 M^2
EST. GFR  (NON AFRICAN AMERICAN): 58 ML/MIN/1.73 M^2
GLUCOSE SERPL-MCNC: 87 MG/DL (ref 70–110)
HCT VFR BLD AUTO: 44.2 % (ref 40–54)
HGB BLD-MCNC: 14.4 G/DL (ref 14–18)
IMM GRANULOCYTES # BLD AUTO: 0.03 K/UL (ref 0–0.04)
IMM GRANULOCYTES NFR BLD AUTO: 0.3 % (ref 0–0.5)
LYMPHOCYTES # BLD AUTO: 6 K/UL (ref 1–4.8)
LYMPHOCYTES NFR BLD: 54.5 % (ref 18–48)
MCH RBC QN AUTO: 30 PG (ref 27–31)
MCHC RBC AUTO-ENTMCNC: 32.6 G/DL (ref 32–36)
MCV RBC AUTO: 92 FL (ref 82–98)
MONOCYTES # BLD AUTO: 0.6 K/UL (ref 0.3–1)
MONOCYTES NFR BLD: 5.6 % (ref 4–15)
NEUTROPHILS # BLD AUTO: 4 K/UL (ref 1.8–7.7)
NEUTROPHILS NFR BLD: 36.3 % (ref 38–73)
NRBC BLD-RTO: 0 /100 WBC
PLATELET # BLD AUTO: 198 K/UL (ref 150–350)
PMV BLD AUTO: 11 FL (ref 9.2–12.9)
POTASSIUM SERPL-SCNC: 4.3 MMOL/L (ref 3.5–5.1)
PROT SERPL-MCNC: 6.8 G/DL (ref 6–8.4)
RBC # BLD AUTO: 4.8 M/UL (ref 4.6–6.2)
SARS-COV-2 RDRP RESP QL NAA+PROBE: NEGATIVE
SODIUM SERPL-SCNC: 140 MMOL/L (ref 136–145)
TROPONIN I SERPL DL<=0.01 NG/ML-MCNC: 0.02 NG/ML (ref 0–0.03)
WBC # BLD AUTO: 10.98 K/UL (ref 3.9–12.7)

## 2020-10-18 PROCEDURE — G0378 HOSPITAL OBSERVATION PER HR: HCPCS

## 2020-10-18 PROCEDURE — U0002 COVID-19 LAB TEST NON-CDC: HCPCS

## 2020-10-18 PROCEDURE — 83880 ASSAY OF NATRIURETIC PEPTIDE: CPT

## 2020-10-18 PROCEDURE — 93010 EKG 12-LEAD: ICD-10-PCS | Mod: ,,, | Performed by: SPECIALIST

## 2020-10-18 PROCEDURE — 99285 EMERGENCY DEPT VISIT HI MDM: CPT | Mod: 25

## 2020-10-18 PROCEDURE — 36415 COLL VENOUS BLD VENIPUNCTURE: CPT

## 2020-10-18 PROCEDURE — 84484 ASSAY OF TROPONIN QUANT: CPT

## 2020-10-18 PROCEDURE — 93010 ELECTROCARDIOGRAM REPORT: CPT | Mod: ,,, | Performed by: SPECIALIST

## 2020-10-18 PROCEDURE — 93005 ELECTROCARDIOGRAM TRACING: CPT

## 2020-10-18 PROCEDURE — 80053 COMPREHEN METABOLIC PANEL: CPT

## 2020-10-18 PROCEDURE — 85025 COMPLETE CBC W/AUTO DIFF WBC: CPT

## 2020-10-18 PROCEDURE — 94760 N-INVAS EAR/PLS OXIMETRY 1: CPT

## 2020-10-18 PROCEDURE — 25000003 PHARM REV CODE 250: Performed by: NURSE PRACTITIONER

## 2020-10-18 RX ORDER — ATORVASTATIN CALCIUM 40 MG/1
40 TABLET, FILM COATED ORAL DAILY
Status: DISCONTINUED | OUTPATIENT
Start: 2020-10-19 | End: 2020-10-20 | Stop reason: HOSPADM

## 2020-10-18 RX ORDER — HYDROCHLOROTHIAZIDE 12.5 MG/1
12.5 CAPSULE ORAL DAILY
COMMUNITY
End: 2021-04-21 | Stop reason: SDUPTHER

## 2020-10-18 RX ORDER — SODIUM CHLORIDE 0.9 % (FLUSH) 0.9 %
10 SYRINGE (ML) INJECTION
Status: DISCONTINUED | OUTPATIENT
Start: 2020-10-18 | End: 2020-10-20 | Stop reason: HOSPADM

## 2020-10-18 RX ORDER — HYDROCHLOROTHIAZIDE 12.5 MG/1
12.5 TABLET ORAL DAILY
Status: DISCONTINUED | OUTPATIENT
Start: 2020-10-19 | End: 2020-10-18

## 2020-10-18 RX ORDER — IRBESARTAN 300 MG/1
300 TABLET ORAL NIGHTLY
COMMUNITY
End: 2021-04-21 | Stop reason: SDUPTHER

## 2020-10-18 RX ORDER — IRBESARTAN 150 MG/1
300 TABLET ORAL NIGHTLY
Status: DISCONTINUED | OUTPATIENT
Start: 2020-10-18 | End: 2020-10-18

## 2020-10-18 RX ORDER — NAPROXEN SODIUM 220 MG/1
81 TABLET, FILM COATED ORAL DAILY
Status: DISCONTINUED | OUTPATIENT
Start: 2020-10-19 | End: 2020-10-20 | Stop reason: HOSPADM

## 2020-10-18 RX ORDER — TAMSULOSIN HYDROCHLORIDE 0.4 MG/1
0.4 CAPSULE ORAL DAILY
Status: DISCONTINUED | OUTPATIENT
Start: 2020-10-19 | End: 2020-10-20 | Stop reason: HOSPADM

## 2020-10-18 RX ORDER — ONDANSETRON 2 MG/ML
4 INJECTION INTRAMUSCULAR; INTRAVENOUS EVERY 6 HOURS PRN
Status: DISCONTINUED | OUTPATIENT
Start: 2020-10-18 | End: 2020-10-20 | Stop reason: HOSPADM

## 2020-10-18 RX ORDER — FOLIC ACID 1 MG/1
1 TABLET ORAL DAILY
Status: DISCONTINUED | OUTPATIENT
Start: 2020-10-19 | End: 2020-10-20 | Stop reason: HOSPADM

## 2020-10-18 RX ADMIN — SOTALOL HYDROCHLORIDE 120 MG: 80 TABLET ORAL at 11:10

## 2020-10-18 RX ADMIN — APIXABAN 5 MG: 2.5 TABLET, FILM COATED ORAL at 11:10

## 2020-10-18 NOTE — ED PROVIDER NOTES
Encounter Date: 10/18/2020    SCRIBE #1 NOTE: I, Sulaiman Barron, am scribing for, and in the presence of, Christiano Niño MD.       History     Chief Complaint   Patient presents with    Transient Ischemic Attack     kasey arm tingling and pain last night, vision changes last week       Time seen by provider: 5:49 PM on 10/18/2020    Jeremias Hall is a 75 y.o. male with PMHx of HTN, clotting disorder, CAD, and long term anticoagulant use who presents to the ED with an onset of bilateral arm pain beginning yesterday evening. Associated symptoms includes numbness of the arms and cold sweating. The patient notes yesterday's episode only last x30 minutes. The patient explains that last week he went to the eye doctor and she indicated he had a stroke by visual deficits in his left eye. The patient explained that his eye has been abnormal for some time and endorses not even noticing the vision deficits prior to the appointment. She advised the patient to the ED if any other symptoms began. The patient denies any other symptoms at this time. Pertinent PSHx includes CABG.       The history is provided by the patient.     Review of patient's allergies indicates:  No Known Allergies  Past Medical History:   Diagnosis Date    Anticoagulant long-term use     Clotting disorder     Coronary artery disease     Encounter for blood transfusion     Hypertension      Past Surgical History:   Procedure Laterality Date    CHOLECYSTECTOMY      CORONARY ARTERY BYPASS GRAFT      knee surgery ,back surgery(bilateral)       No family history on file.  Social History     Tobacco Use    Smoking status: Former Smoker     Quit date: 1980     Years since quittin.8    Smokeless tobacco: Never Used    Tobacco comment: smoked pipe   Substance Use Topics    Alcohol use: Yes     Alcohol/week: 0.0 standard drinks     Comment: socially    Drug use: No     Review of Systems   Constitutional: Positive for diaphoresis. Negative for  fever.   HENT: Negative for sore throat.    Respiratory: Negative for shortness of breath.    Cardiovascular: Negative for chest pain.   Gastrointestinal: Negative for nausea.   Genitourinary: Negative for dysuria.   Musculoskeletal: Positive for arthralgias. Negative for back pain.   Skin: Negative for rash.   Neurological: Positive for numbness. Negative for weakness.   Hematological: Does not bruise/bleed easily.       Physical Exam     Initial Vitals [10/18/20 1720]   BP Pulse Resp Temp SpO2   (!) 195/95 (!) 59 18 97.2 °F (36.2 °C) 97 %      MAP       --         Physical Exam    Nursing note and vitals reviewed.  Constitutional: He appears well-developed and well-nourished.  Non-toxic appearance. No distress.   HENT:   Head: Normocephalic and atraumatic.   Eyes: EOM are normal. Pupils are equal, round, and reactive to light.   Left monocular visual field cut of the medical lower quadrant. PERRL. EOMI.    Neck: Normal range of motion. Neck supple. No neck rigidity. No JVD present.   Cardiovascular: Normal rate, regular rhythm, normal heart sounds and intact distal pulses. Exam reveals no gallop and no friction rub.    No murmur heard.  Pulmonary/Chest: Breath sounds normal. He has no wheezes. He has no rhonchi. He has no rales.   Abdominal: Soft. Bowel sounds are normal. He exhibits no distension. There is no abdominal tenderness. There is no rebound and no guarding.   Musculoskeletal: Normal range of motion.   Neurological: He is alert and oriented to person, place, and time. He has normal strength and normal reflexes. No cranial nerve deficit or sensory deficit. He exhibits normal muscle tone. Coordination normal. GCS eye subscore is 4. GCS verbal subscore is 5. GCS motor subscore is 6.   5/5 Strength and sensation to light touch of BUE and BLE. Cranial nerves III-XII intact.    Skin: Skin is warm and dry.   Psychiatric: He has a normal mood and affect. His speech is normal and behavior is normal. He is not  actively hallucinating.         ED Course   Procedures  Labs Reviewed   CBC W/ AUTO DIFFERENTIAL - Abnormal; Notable for the following components:       Result Value    Lymph # 6.0 (*)     Gran% 36.3 (*)     Lymph% 54.5 (*)     All other components within normal limits   COMPREHENSIVE METABOLIC PANEL - Abnormal; Notable for the following components:    eGFR if non  58 (*)     All other components within normal limits   B-TYPE NATRIURETIC PEPTIDE - Abnormal; Notable for the following components:     (*)     All other components within normal limits   TROPONIN I   SARS-COV-2 RNA AMPLIFICATION, QUAL     EKG Readings: (Independently Interpreted)   Initial Reading: No STEMI.   Sinus bradycardia, 55 beats per minute with nonspecific ST and T-wave abnormalities.  Left axis deviation.       Imaging Results          CT Head Without Contrast (In process)                X-Ray Chest AP Portable (In process)                  Medical Decision Making:   History:   Old Medical Records: I decided to obtain old medical records.  Initial Assessment:   75-year-old man with history of CAD with cardiac stents, hypertension man recently told by his ophthalmologist that he had a stroke in his left eye presents emergency department for evaluation of bilateral arm pains and paresthesias with numbness and last approximately 30 min last night.  Symptoms have resolved.  Cardiac workup initiated for atypical ACS and was negative.  CT head unremarkable.  No reproducible symptoms on axial loading of the cervical spine.  Differential diagnosis includes TIA, atypical ACS, cervical radiculopathy.  Patient with multiple risk factors.  Discussed Hospital Medicine who agrees to admit the patient for TIA workup and rule out ACS by trending his troponins.  Independently Interpreted Test(s):   I have ordered and independently interpreted X-rays - see prior notes.  I have ordered and independently interpreted EKG Reading(s) - see  prior notes  Clinical Tests:   Lab Tests: Ordered and Reviewed  Radiological Study: Reviewed and Ordered  Medical Tests: Ordered and Reviewed            Scribe Attestation:   Scribe #1: I performed the above scribed service and the documentation accurately describes the services I performed. I attest to the accuracy of the note.     I, Salinas Arevalo, personally performed the services described in this documentation. All medical record entries made by the scribe were at my direction and in my presence.  I have reviewed the chart and agree that the record reflects my personal performance and is accurate and complete. Christiano Niño MD.                  Clinical Impression:     ICD-10-CM ICD-9-CM   1. TIA (transient ischemic attack)  G45.9 435.9   2. Arm pain  M79.603 729.5   3. Paresthesia of arm  R20.2 782.0                          ED Disposition Condition    Observation                             Christiano Niño MD  10/18/20 2010

## 2020-10-19 LAB
ALBUMIN SERPL BCP-MCNC: 3.3 G/DL (ref 3.5–5.2)
ALP SERPL-CCNC: 62 U/L (ref 55–135)
ALT SERPL W/O P-5'-P-CCNC: 15 U/L (ref 10–44)
ANION GAP SERPL CALC-SCNC: 11 MMOL/L (ref 8–16)
AORTIC ROOT ANNULUS: 3.78 CM
AORTIC VALVE CUSP SEPERATION: 2.25 CM
APTT BLDCRRT: 31.1 SEC (ref 21–32)
AST SERPL-CCNC: 16 U/L (ref 10–40)
AV INDEX (PROSTH): 0.77
AV MEAN GRADIENT: 2 MMHG
AV PEAK GRADIENT: 3 MMHG
AV VALVE AREA: 2.77 CM2
AV VELOCITY RATIO: 0.96
BASOPHILS # BLD AUTO: 0.04 K/UL (ref 0–0.2)
BASOPHILS NFR BLD: 0.5 % (ref 0–1.9)
BILIRUB SERPL-MCNC: 0.5 MG/DL (ref 0.1–1)
BSA FOR ECHO PROCEDURE: 2.35 M2
BUN SERPL-MCNC: 13 MG/DL (ref 8–23)
CALCIUM SERPL-MCNC: 8.6 MG/DL (ref 8.7–10.5)
CHLORIDE SERPL-SCNC: 104 MMOL/L (ref 95–110)
CHOLEST SERPL-MCNC: 137 MG/DL (ref 120–199)
CHOLEST/HDLC SERPL: 4.9 {RATIO} (ref 2–5)
CK MB SERPL-MCNC: 0.9 NG/ML (ref 0.1–6.5)
CK MB SERPL-RTO: 1.2 % (ref 0–5)
CK SERPL-CCNC: 74 U/L (ref 20–200)
CO2 SERPL-SCNC: 26 MMOL/L (ref 23–29)
CREAT SERPL-MCNC: 1.2 MG/DL (ref 0.5–1.4)
CV ECHO LV RWT: 0.49 CM
DIFFERENTIAL METHOD: ABNORMAL
DOP CALC AO PEAK VEL: 0.91 M/S
DOP CALC AO VTI: 26.6 CM
DOP CALC LVOT AREA: 3.6 CM2
DOP CALC LVOT DIAMETER: 2.14 CM
DOP CALC LVOT PEAK VEL: 0.87 M/S
DOP CALC LVOT STROKE VOLUME: 73.7 CM3
DOP CALCLVOT PEAK VEL VTI: 20.5 CM
E WAVE DECELERATION TIME: 202.87 MSEC
E/A RATIO: 1.46
ECHO LV POSTERIOR WALL: 1.13 CM (ref 0.6–1.1)
EOSINOPHIL # BLD AUTO: 0.3 K/UL (ref 0–0.5)
EOSINOPHIL NFR BLD: 2.9 % (ref 0–8)
ERYTHROCYTE [DISTWIDTH] IN BLOOD BY AUTOMATED COUNT: 13.1 % (ref 11.5–14.5)
EST. GFR  (AFRICAN AMERICAN): >60 ML/MIN/1.73 M^2
EST. GFR  (NON AFRICAN AMERICAN): 58 ML/MIN/1.73 M^2
ESTIMATED AVG GLUCOSE: 117 MG/DL (ref 68–131)
FRACTIONAL SHORTENING: 24 % (ref 28–44)
GLUCOSE SERPL-MCNC: 135 MG/DL (ref 70–110)
HBA1C MFR BLD HPLC: 5.7 % (ref 4–5.6)
HCT VFR BLD AUTO: 40.1 % (ref 40–54)
HDLC SERPL-MCNC: 28 MG/DL (ref 40–75)
HDLC SERPL: 20.4 % (ref 20–50)
HGB BLD-MCNC: 13.4 G/DL (ref 14–18)
IMM GRANULOCYTES # BLD AUTO: 0.03 K/UL (ref 0–0.04)
IMM GRANULOCYTES NFR BLD AUTO: 0.3 % (ref 0–0.5)
INR PPP: 1.1 (ref 0.8–1.2)
INTERVENTRICULAR SEPTUM: 0.98 CM (ref 0.6–1.1)
LA MAJOR: 4.78 CM
LA MINOR: 5.4 CM
LA WIDTH: 3.78 CM
LDLC SERPL CALC-MCNC: 76.2 MG/DL (ref 63–159)
LEFT ATRIUM SIZE: 3.55 CM
LEFT ATRIUM VOLUME INDEX: 25.5 ML/M2
LEFT ATRIUM VOLUME: 57.84 CM3
LEFT INTERNAL DIMENSION IN SYSTOLE: 3.48 CM (ref 2.1–4)
LEFT VENTRICLE DIASTOLIC VOLUME INDEX: 42.52 ML/M2
LEFT VENTRICLE DIASTOLIC VOLUME: 96.57 ML
LEFT VENTRICLE MASS INDEX: 75 G/M2
LEFT VENTRICLE SYSTOLIC VOLUME INDEX: 22.1 ML/M2
LEFT VENTRICLE SYSTOLIC VOLUME: 50.09 ML
LEFT VENTRICULAR INTERNAL DIMENSION IN DIASTOLE: 4.58 CM (ref 3.5–6)
LEFT VENTRICULAR MASS: 169.78 G
LYMPHOCYTES # BLD AUTO: 4.4 K/UL (ref 1–4.8)
LYMPHOCYTES NFR BLD: 50.3 % (ref 18–48)
MAGNESIUM SERPL-MCNC: 2.1 MG/DL (ref 1.6–2.6)
MCH RBC QN AUTO: 30.6 PG (ref 27–31)
MCHC RBC AUTO-ENTMCNC: 33.4 G/DL (ref 32–36)
MCV RBC AUTO: 92 FL (ref 82–98)
MONOCYTES # BLD AUTO: 0.5 K/UL (ref 0.3–1)
MONOCYTES NFR BLD: 6 % (ref 4–15)
MV PEAK A VEL: 0.46 M/S
MV PEAK E VEL: 0.67 M/S
NEUTROPHILS # BLD AUTO: 3.5 K/UL (ref 1.8–7.7)
NEUTROPHILS NFR BLD: 40 % (ref 38–73)
NONHDLC SERPL-MCNC: 109 MG/DL
NRBC BLD-RTO: 0 /100 WBC
PHOSPHATE SERPL-MCNC: 2 MG/DL (ref 2.7–4.5)
PISA MRMAX VEL: 0.03 M/S
PISA TR MAX VEL: 2.16 M/S
PLATELET # BLD AUTO: 167 K/UL (ref 150–350)
PMV BLD AUTO: 10.9 FL (ref 9.2–12.9)
POTASSIUM SERPL-SCNC: 3.5 MMOL/L (ref 3.5–5.1)
PROT SERPL-MCNC: 6 G/DL (ref 6–8.4)
PROTHROMBIN TIME: 12.5 SEC (ref 9–12.5)
PV PEAK VELOCITY: 0.54 CM/S
RA MAJOR: 4.34 CM
RA PRESSURE: 3 MMHG
RA WIDTH: 3.45 CM
RBC # BLD AUTO: 4.38 M/UL (ref 4.6–6.2)
RIGHT VENTRICULAR END-DIASTOLIC DIMENSION: 2.26 CM
SINUS: 3.99 CM
SODIUM SERPL-SCNC: 141 MMOL/L (ref 136–145)
TR MAX PG: 19 MMHG
TRICUSPID ANNULAR PLANE SYSTOLIC EXCURSION: 1.75 CM
TRIGL SERPL-MCNC: 164 MG/DL (ref 30–150)
TROPONIN I SERPL DL<=0.01 NG/ML-MCNC: 0.01 NG/ML (ref 0–0.03)
TROPONIN I SERPL DL<=0.01 NG/ML-MCNC: <0.006 NG/ML (ref 0–0.03)
TROPONIN I SERPL DL<=0.01 NG/ML-MCNC: <0.006 NG/ML (ref 0–0.03)
TSH SERPL DL<=0.005 MIU/L-ACNC: 0.97 UIU/ML (ref 0.4–4)
TV REST PULMONARY ARTERY PRESSURE: 22 MMHG
WBC # BLD AUTO: 8.69 K/UL (ref 3.9–12.7)

## 2020-10-19 PROCEDURE — 84484 ASSAY OF TROPONIN QUANT: CPT | Mod: 91

## 2020-10-19 PROCEDURE — 85730 THROMBOPLASTIN TIME PARTIAL: CPT

## 2020-10-19 PROCEDURE — 83735 ASSAY OF MAGNESIUM: CPT

## 2020-10-19 PROCEDURE — 80061 LIPID PANEL: CPT

## 2020-10-19 PROCEDURE — 85025 COMPLETE CBC W/AUTO DIFF WBC: CPT

## 2020-10-19 PROCEDURE — 25000003 PHARM REV CODE 250: Performed by: NURSE PRACTITIONER

## 2020-10-19 PROCEDURE — G0378 HOSPITAL OBSERVATION PER HR: HCPCS

## 2020-10-19 PROCEDURE — 83036 HEMOGLOBIN GLYCOSYLATED A1C: CPT

## 2020-10-19 PROCEDURE — 80053 COMPREHEN METABOLIC PANEL: CPT

## 2020-10-19 PROCEDURE — 97161 PT EVAL LOW COMPLEX 20 MIN: CPT

## 2020-10-19 PROCEDURE — 84484 ASSAY OF TROPONIN QUANT: CPT

## 2020-10-19 PROCEDURE — 97165 OT EVAL LOW COMPLEX 30 MIN: CPT

## 2020-10-19 PROCEDURE — 82550 ASSAY OF CK (CPK): CPT

## 2020-10-19 PROCEDURE — 84100 ASSAY OF PHOSPHORUS: CPT

## 2020-10-19 PROCEDURE — 25500020 PHARM REV CODE 255: Performed by: HOSPITALIST

## 2020-10-19 PROCEDURE — 85610 PROTHROMBIN TIME: CPT

## 2020-10-19 PROCEDURE — 84443 ASSAY THYROID STIM HORMONE: CPT

## 2020-10-19 PROCEDURE — 94761 N-INVAS EAR/PLS OXIMETRY MLT: CPT

## 2020-10-19 PROCEDURE — 82553 CREATINE MB FRACTION: CPT

## 2020-10-19 PROCEDURE — 92610 EVALUATE SWALLOWING FUNCTION: CPT

## 2020-10-19 PROCEDURE — 36415 COLL VENOUS BLD VENIPUNCTURE: CPT

## 2020-10-19 RX ADMIN — ASPIRIN 81 MG 81 MG: 81 TABLET ORAL at 08:10

## 2020-10-19 RX ADMIN — APIXABAN 5 MG: 2.5 TABLET, FILM COATED ORAL at 08:10

## 2020-10-19 RX ADMIN — APIXABAN 5 MG: 2.5 TABLET, FILM COATED ORAL at 09:10

## 2020-10-19 RX ADMIN — TAMSULOSIN HYDROCHLORIDE 0.4 MG: 0.4 CAPSULE ORAL at 08:10

## 2020-10-19 RX ADMIN — ATORVASTATIN CALCIUM 40 MG: 40 TABLET, FILM COATED ORAL at 08:10

## 2020-10-19 RX ADMIN — FOLIC ACID 1 MG: 1 TABLET ORAL at 08:10

## 2020-10-19 RX ADMIN — IOHEXOL 75 ML: 350 INJECTION, SOLUTION INTRAVENOUS at 04:10

## 2020-10-19 NOTE — CONSULTS
Food & Nutrition Education    Diet Education: Cardiac diet   Time Spent: 10 minutes   Learners: Patient     Nutrition Education provided with handouts: Stroke Nutrition Therapy, [employer]      Assessment: No weight hx per chart. Pt appeared nourished, did not report decreased appetite/wt loss concerns. Reported eating/liking fried foods, steak, red beans and rice, etc.     Comments: Educated pt on low sodium alternatives, sodium amount per meal, and choosing low-fat/fat-free dairy and meats. Discussed who cooks/prepares meals at home and appropriate portion sizes for meat. All questions and concerns answered. Dietitian's contact information provided.    Follow-Up: yes    Please Re-consult as needed!    Thanks!

## 2020-10-19 NOTE — ASSESSMENT & PLAN NOTE
Chronic, controlled.  Latest blood pressure and vitals reviewed-   Temp:  [97.2 °F (36.2 °C)-97.7 °F (36.5 °C)]   Pulse:  [50-61]   Resp:  [17-18]   BP: (141-195)/(75-98)   SpO2:  [96 %-99 %] .   Home meds for hypertension were reviewed and noted below. Hospital anti-hypertensive changes were made as shown below.  Hypertension Medications             hydroCHLOROthiazide (MICROZIDE) 12.5 mg capsule Take 12.5 mg by mouth once daily.    irbesartan (AVAPRO) 300 MG tablet Take 300 mg by mouth every evening.    sotalol (BETAPACE) 120 MG Tab Take 80 mg by mouth every 12 (twelve) hours.      Hospital Medications             hydroCHLOROthiazide tablet 12.5 mg Starting on 10/19/2020. 12.5 mg, Oral, Daily    irbesartan tablet 300 mg 300 mg, Oral, Nightly    sotalol split tablet 120 mg 120 mg, Oral, Every 12 hours        Will treat with PRN antihypertensives, as needed, to maintain BP less than 180/110 or if patient becomes symptomatic.  HOLD all home antihypertensives in the setting of TIA, allowing permissive HTN - No intervention for SBP <220 - please reintroduce antihypertensives to clinical regimen once clinically appropriate.

## 2020-10-19 NOTE — ASSESSMENT & PLAN NOTE
Resolution of symptoms noted upon admission    Antithrombotics for secondary stroke prevention: Antiplatelets: Aspirin: 81 mg daily    Statins for secondary stroke prevention and hyperlipidemia, if present:   Statins: Atorvastatin- 40 mg daily    Aggressive risk factor modification: HTN, HLD, Diet, Exercise, CAD     Rehab efforts: The patient has been evaluated by a stroke team provider and the therapy needs have been fully considered based off the presenting complaints and exam findings. The following therapy evaluations are needed: PT evaluate and treat, OT evaluate and treat, SLP evaluate and treat    Diagnostics ordered/pending: Carotid ultrasound to assess vasculature, HgbA1C to assess blood glucose levels, Lipid Profile to assess cholesterol levels, TTE to assess cardiac function/status , TSH to assess thyroid function    VTE prophylaxis: None: Reason for No Pharmacological VTE Prophylaxis: Currently on anticoagulation    BP parameters: TIA: SBP <220 until imaging confirmation of no infarct      MRI not ordered secondary to LOOP recorder in place - defer to neurology.  Neuro checks - neurology consulted.

## 2020-10-19 NOTE — PT/OT/SLP EVAL
"Speech Language Pathology   Bedside Swallow Evaluation & Discharge    Patient Name:  Jeremias Hall   MRN:  [id number]  Admitting Diagnosis: TIA (transient ischemic attack)    Recommendations:                 General Recommendations:  Follow-up not indicated  Diet recommendations:  Regular, Thin   Aspiration Precautions: Standard aspiration precautions   General Precautions: Standard,    Communication strategies:  none    History:     Past Medical History:   Diagnosis Date    Anticoagulant long-term use     Clotting disorder     Coronary artery disease     Encounter for blood transfusion     Hypertension        Past Surgical History:   Procedure Laterality Date    CHOLECYSTECTOMY      CORONARY ARTERY BYPASS GRAFT      knee surgery ,back surgery(bilateral)         Social History: Patient lives with wife.    Prior Intubation HX:  None this admit    Modified Barium Swallow: None in Epic    Imaging;  CT Head Without Contrast   Final Result      No acute intracranial findings      Final read         Electronically signed by: Antonietta Israel MD   Date:    10/19/2020   Time:    09:07      X-Ray Chest AP Portable   Final Result      No acute cardiopulmonary disease.         Electronically signed by: Antonietta Israel MD   Date:    10/19/2020   Time:    09:08       Carotid Bilateral    (Results Pending)        Prior diet: Regular/thin.    Occupation/hobbies/homemaking: PLOF; independent.    Subjective     "She [eye doctor] said I had a stroke in my L eye."    Objective:   Pt seen for clinical sallow evaluation. He is AAOx4. Speech is clear, fluent and appropriate. Ablel to follow simple commands. Pt reports speech/cognition at baseline; denies any AMS. Granddaughter (nurse)  at bedside and reports mental status at baseline.     Oral Musculature Evaluation  · Oral Musculature: WFL  · Dentition: present and adequate  · Secretion Management: adequate  · Mucosal Quality: good  · Mandibular Strength and Mobility: " WFL  · Oral Labial Strength and Mobility: WFL  · Lingual Strength and Mobility: WFL  · Velar Elevation: WFL  · Buccal Strength and Mobility: WFL  · Volitional Cough: adeqyate  · Volitional Swallow: able to palpate laryngeal rise  · Voice Prior to PO Intake: clear    Bedside Swallow Eval:   Consistencies Assessed:  · Thin liquids --via cup and straw  · Puree --applesauce  · Mixed consistencies --diced peaches  · Solids --cookie     Oral Phase:   · WFL    Pharyngeal Phase:   · no overt clinical signs/symptoms of aspiration    Compensatory Strategies  · None    Assessment:   Clinical swallowing evaluation completed. All po trials tolerated with no overt s/s swallow dysfunction. REC Regular textures with thin liquids. Pt/family report mental status at basline; CT negative. No f/u indicated ATT. Please re consult PRN.       Plan:     · Patient to be seen:      · Plan of Care expires:     · Plan of Care reviewed with:  patient, grandchild(marita)   · SLP Follow-Up:  No       Discharge recommendations:  home   Barriers to Discharge:  None    Time Tracking:     SLP Treatment Date:   10/19/20  Speech Start Time:  1121  Speech Stop Time:  1129     Speech Total Time (min):  8 min    Billable Minutes: Eval Swallow and Oral Function 8 and Total Time 8    Amalia Neff CCC-SLP  10/19/2020

## 2020-10-19 NOTE — ASSESSMENT & PLAN NOTE
Chronic, uncontrolled.  Latest blood pressure and vitals reviewed-   Temp:  [97 °F (36.1 °C)-97.9 °F (36.6 °C)]   Pulse:  [50-61]   Resp:  [16-20]   BP: (111-195)/(70-98)   SpO2:  [95 %-99 %] .   Home meds for hypertension were reviewed and noted below. Hospital anti-hypertensive changes were made as shown below.  Hypertension Medications             hydroCHLOROthiazide (MICROZIDE) 12.5 mg capsule Take 12.5 mg by mouth once daily.    irbesartan (AVAPRO) 300 MG tablet Take 300 mg by mouth every evening.    sotalol (BETAPACE) 120 MG Tab Take 80 mg by mouth every 12 (twelve) hours.      Hospital Medications             hydroCHLOROthiazide tablet 12.5 mg Starting on 10/19/2020. 12.5 mg, Oral, Daily    irbesartan tablet 300 mg 300 mg, Oral, Nightly    sotalol split tablet 120 mg 120 mg, Oral, Every 12 hours        Will treat with PRN antihypertensives, as needed, to maintain BP less than 180/110 or if patient becomes symptomatic.  HOLD all home antihypertensives in the setting of TIA, allowing permissive HTN - No intervention for SBP <220 - please reintroduce antihypertensives to clinical regimen once clinically appropriate.

## 2020-10-19 NOTE — PLAN OF CARE
Patient observed in bed with his eyes closed, VSS, Fall & Safety precautions maintained, Patient is alert & oriented x 4, Patient denies any pain or discomfort, continuous Tele monitoring, Tele # [phone], Normal Sinus Rhythm, Patient ambulates with standby assist, Bed alarm on, Call bell within reach, Patient reminded to use call bell/ask for assist, HOB elevated, personal belongings within reach, general condition stable, no s/sx of acute distress observed, will continue to monitor.

## 2020-10-19 NOTE — SUBJECTIVE & OBJECTIVE
Interval History:  Patient seen and examined.  Neurology nurse practitioner also in room.  Patient  Resting in bed.  Reports continued blurry vision to left lower vision field.  Otherwise no complaints.    Review of Systems   Constitutional: Negative for activity change, appetite change, chills, fatigue and fever.   HENT: Negative for congestion, sinus pressure, sinus pain and sore throat.    Eyes: Positive for visual disturbance. Negative for photophobia.   Respiratory: Negative for cough, choking, chest tightness, shortness of breath and wheezing.    Cardiovascular: Negative for chest pain, palpitations and leg swelling.   Gastrointestinal: Negative for abdominal distention, abdominal pain, blood in stool, constipation, diarrhea, nausea and vomiting.   Genitourinary: Negative for difficulty urinating, dysuria, flank pain and hematuria.   Musculoskeletal: Negative for back pain, gait problem and joint swelling.   Skin: Negative for rash and wound.   Neurological: Negative for dizziness, syncope, weakness, light-headedness, numbness and headaches.   Psychiatric/Behavioral: Negative for confusion. The patient is not nervous/anxious.      Objective:     Vital Signs (Most Recent):  Temp: 97.9 °F (36.6 °C) (10/19/20 1153)  Pulse: (!) 55 (10/19/20 1153)  Resp: 18 (10/19/20 1153)  BP: (!) 142/84 (10/19/20 1153)  SpO2: 97 % (10/19/20 1153) Vital Signs (24h Range):  Temp:  [97 °F (36.1 °C)-97.9 °F (36.6 °C)] 97.9 °F (36.6 °C)  Pulse:  [50-61] 55  Resp:  [16-20] 18  SpO2:  [95 %-99 %] 97 %  BP: (111-195)/(70-98) 142/84     Weight: 113.4 kg (250 lb)  Body mass index is 36.92 kg/m².    Intake/Output Summary (Last 24 hours) at 10/19/2020 1454  Last data filed at 10/19/2020 0100  Gross per 24 hour   Intake --   Output 625 ml   Net -625 ml      Physical Exam  Vitals signs and nursing note reviewed.   Constitutional:       General: He is not in acute distress.     Appearance: He is well-developed. He is obese. He is not  diaphoretic.   HENT:      Head: Normocephalic and atraumatic.   Eyes:      General:         Right eye: No discharge.         Left eye: No discharge.      Pupils: Pupils are equal, round, and reactive to light.   Neck:      Musculoskeletal: Normal range of motion.      Vascular: No JVD.   Cardiovascular:      Rate and Rhythm: Normal rate and regular rhythm.      Heart sounds: No murmur.   Pulmonary:      Effort: Pulmonary effort is normal. No respiratory distress.      Breath sounds: Normal breath sounds. No wheezing or rales.   Chest:      Chest wall: No tenderness.   Abdominal:      General: Bowel sounds are normal. There is no distension.      Palpations: Abdomen is soft.      Tenderness: There is no abdominal tenderness. There is no guarding or rebound.   Genitourinary:     Comments:     Musculoskeletal: Normal range of motion.         General: No tenderness or deformity.   Skin:     General: Skin is warm and dry.      Capillary Refill: Capillary refill takes less than 2 seconds.      Findings: No erythema or rash.   Neurological:      General: No focal deficit present.      Mental Status: He is alert and oriented to person, place, and time.      Cranial Nerves: No cranial nerve deficit.      Sensory: No sensory deficit.   Psychiatric:         Mood and Affect: Mood normal.         Behavior: Behavior normal.         Thought Content: Thought content normal.         Judgment: Judgment normal.         Significant Labs: All pertinent labs within the past 24 hours have been reviewed.    Significant Imaging: I have reviewed and interpreted all pertinent imaging results/findings within the past 24 hours.

## 2020-10-19 NOTE — CONSULTS
"[facility]  Neurology  Consult Note    Patient Name: Jeremias Hall  MRN: [id number]  Admission Date: 10/18/2020  Hospital Length of Stay: 0 days  Code Status: Full Code   Attending Provider: Naheed Santoyo MD   Consulting Provider: Carley Santana DNP  Primary Care Physician: Travis Go MD  Principal Problem:TIA (transient ischemic attack)    Consults  Subjective:     Chief Complaint:  TIA, BL arm tingling and pain x 1 day , vision changes x 1 week   HPI: per EMR Jeremias Hall is a 75-year-old male with a past medical history of DVT on Eliquis, hyperlipidemia, hypertension, and coronary artery disease who presents to the emergency room [facility] with reports of bilateral upper extremity numbness.  Patient states approximately 1 week ago he began to experience left-sided visual disturbances prompting him to visit his ophthalmologist.  Patient states he was seen by his ophthalmologist and told he had a "Stroke in his left eye.  Patient states approximately 2 days ago while he was in [place] he began to Not feel right.  Patient states It was almost like I was spaced and wasn't all together.  Patient states he utilized his wife's supplemental oxygen at the time with improvement of symptoms.  Patient states that his wife is currently being treated for cancer at [facility].  Patient reports he returned back home to [place] yesterday morning.  He states yesterday evening he began to experience bilateral upper extremity numbness and pain.  Patient states onset of symptoms occurred when he was awoken out of his sleep with a cold sweat.  Patient states the symptoms lasted approximately 10-15 minutes.  Patient denies history of stroke.  Patient states he recalls similar symptoms in the past whenever he got his 1st cardiac stent. Denies CP, SOB, Fever, Vomiting, Diarrhea, Syncope, or Trauma. Patient states he is currently being treated with daily Eliquis for a left lower " extremity DVT, initial diagnosis 2013 - states he was initially on Coumadin then switched to Eliquis.  Patient states he follows with Dr. Braga, cardiology-states he currently has a loop recorder in place that is due to be removed.  In the emergency room initial workup benign with the exception of a mildly elevated BNP.  CT head obtained revealing no acute intracranial abnormality.  Patient placed in observation on 10/18/2020 at approximately 10:00 p.m..  Patient reportedly lives at home with his wife, denies use of supplemental oxygen or ambulatory assist devices.  Patient accompanied by his son and granddaughter during my initial interview and physical exam.    INTERVAL HISTORY: Patient in bed resting, AAOx3. No focal neurological deficits noted. Patient continues to have blurry vision to lower vision fields in left eye.     Brain imaging:  CTH w/o contrast results   Impression:     No acute intracranial findings       Neck imaging   CUS    Impression:     No evidence of a hemodynamically significant carotid bifurcation stenosis.    Heart imaging TTE w bubble pending     CRT. 1.2  LDL: 76.2  Hgb A1c pending     Past Medical History:   Diagnosis Date    Anticoagulant long-term use     Clotting disorder     Coronary artery disease     Encounter for blood transfusion     Hypertension        Past Surgical History:   Procedure Laterality Date    CHOLECYSTECTOMY      CORONARY ARTERY BYPASS GRAFT      knee surgery ,back surgery(bilateral)         Review of patient's allergies indicates:  No Known Allergies    Current Neurological Medications:     No current facility-administered medications on file prior to encounter.      Current Outpatient Medications on File Prior to Encounter   Medication Sig    apixaban (ELIQUIS) 5 mg Tab Take 5 mg by mouth 2 (two) times daily.    aspirin 81 MG Chew Take 81 mg by mouth once daily. Takes at night    folic acid (FOLVITE) 1 MG tablet     hydroCHLOROthiazide (MICROZIDE)  12.5 mg capsule Take 12.5 mg by mouth once daily.    irbesartan (AVAPRO) 300 MG tablet Take 300 mg by mouth every evening.    sotalol (BETAPACE) 120 MG Tab Take 80 mg by mouth every 12 (twelve) hours.    tamsulosin (FLOMAX) 0.4 mg Cp24 Take 0.4 mg by mouth once daily.      Family History     Family history is unknown by patient.        Tobacco Use    Smoking status: Former Smoker     Quit date: 1980     Years since quittin.8    Smokeless tobacco: Never Used    Tobacco comment: smoked pipe   Substance and Sexual Activity    Alcohol use: Yes     Alcohol/week: 0.0 standard drinks     Comment: socially    Drug use: No    Sexual activity: Not on file     Review of Systems   Constitutional: Negative.    HENT: Negative.    Eyes: Positive for visual disturbance. Negative for photophobia and redness.   Respiratory: Negative.    Cardiovascular:        Bradycardia with associated weakness   Gastrointestinal: Negative.    Musculoskeletal: Negative.    Neurological: Negative.    Psychiatric/Behavioral: Negative.      Objective:     Vital Signs (Most Recent):  Temp: 97.2 °F (36.2 °C) (10/19/20 0442)  Pulse: (!) 56 (10/19/20 08)  Resp: 16 (10/19/20 08)  BP: 120/70 (10/19/20 08)  SpO2: 95 % (10/19/20 0812) Vital Signs (24h Range):  Temp:  [97.1 °F (36.2 °C)-97.7 °F (36.5 °C)] 97.2 °F (36.2 °C)  Pulse:  [50-61] 56  Resp:  [16-20] 16  SpO2:  [95 %-99 %] 95 %  BP: (111-195)/(70-98) 120/70     Weight: 113.5 kg (250 lb 3.6 oz)  Body mass index is 36.95 kg/m².    Physical Exam      Vitals signs and nursing note reviewed.   Constitutional:       General: He is not in acute distress.     Appearance: He is well-developed. He is obese. He is not diaphoretic.   HENT:      Head: Normocephalic and atraumatic.   Eyes:      General:         Right eye: No discharge.         Left eye: No discharge.      Pupils: Pupils are equal, round, and reactive to light.   Neck:      Musculoskeletal: Normal range of motion.       Vascular: No JVD.   Cardiovascular:      Rate and Rhythm: Normal rate and regular rhythm.     Pulmonary:      Effort: Pulmonary effort is normal. No respiratory distress.     Chest:      Chest wall: No tenderness.   Abdominal:      General:  There is no distension.      Palpations: Abdomen is soft.      Tenderness: There is no abdominal tenderness. There is no guarding or rebound.       Musculoskeletal: Normal range of motion.         General: No tenderness or deformity.   Skin:     General: Skin is warm and dry.      Capillary Refill: Capillary refill takes 2 to 3 seconds.      Findings: No erythema or rash.      Psychiatric:         Mood and Affect: Mood normal.         Behavior: Behavior normal.         Thought Content: Thought content normal.         Judgment: Judgment normal.        Significant Labs:   BMP:   Recent Labs   Lab 10/18/20  1834 10/19/20  0111   GLU 87 135*    141   K 4.3 3.5    104   CO2 27 26   BUN 14 13   CREATININE 1.2 1.2   CALCIUM 9.3 8.6*   MG  --  2.1     CBC:   Recent Labs   Lab 10/18/20  1834 10/19/20  0111   WBC 10.98 8.69   HGB 14.4 13.4*   HCT 44.2 40.1    167     CMP:   Recent Labs   Lab 10/18/20  1834 10/19/20  0111   GLU 87 135*    141   K 4.3 3.5    104   CO2 27 26   BUN 14 13   CREATININE 1.2 1.2   CALCIUM 9.3 8.6*   MG  --  2.1   PROT 6.8 6.0   ALBUMIN 3.9 3.3*   BILITOT 0.7 0.5   ALKPHOS 63 62   AST 20 16   ALT 17 15   ANIONGAP 10 11   EGFRNONAA 58* 58*     All pertinent lab results from the past 24 hours have been reviewed.    Significant Imaging: I have reviewed all pertinent imaging results/findings within the past 24 hours.    Assessment and Plan:  1. Central Retinal Artery Occlusion   -CTH without contrast negative for acute bleed  -Exam non-focal except blurry lower visual field in left eye   -Continue aspirin, Eliquis and statin therapy for stroke prevention   -Frequent neuro checks     2. R/o TIA/CVA  -as noted above   -MRI not done due to  patient having loop recorder in place. Dr Braga spoke with Dr. Pramod Puckett and reported that it is ok for patient to have MRI completed today.   -Obtain CTA head and neck   -TTE pending   -CUS negative for stenosis/ occlusion     2. Hypertension   -at goal this morning   -IM following     3. H/O DVT   -Continue Eliquis 5mg BID     4. Hyperlipidemia   -LDL 76.2  -Continue static therapy for LDL goal 70    [phone] within 2 weeks from discharge.  Stroke education was provided including stroke risk factors modification and any acute neurological changes including weakness, confusion, visual changes to come straight to the ER.    All side effects of new medications were discussed with patient and/or next of kin and all questions were answered.       Active Diagnoses:    Diagnosis Date Noted POA    PRINCIPAL PROBLEM:  TIA (transient ischemic attack) [G45.9] 10/18/2020 Yes    Current use of long term anticoagulation [Z79.01] 10/18/2020 Not Applicable    Elevated brain natriuretic peptide (BNP) level [R79.89] 10/18/2020 Yes    HLD (hyperlipidemia) [E78.5] 10/18/2020 Yes    Essential hypertension [I10] 02/19/2017 Yes    Coronary artery disease involving native coronary artery without angina pectoris [I25.10] 02/19/2017 Yes      Problems Resolved During this Admission:       VTE Risk Mitigation (From admission, onward)         Ordered     apixaban tablet 5 mg  2 times daily      10/18/20 2252     IP VTE HIGH RISK PATIENT  Once      10/18/20 2252     Place sequential compression device  Until discontinued      10/18/20 2252     Reason for No Pharmacological VTE Prophylaxis  Once     Question:  Reasons:  Answer:  Already adequately anticoagulated on oral Anticoagulants    10/18/20 2252                Thank you for your consult. I will follow-up with patient. Please contact us if you have any additional questions.    Carley Santana, MONSERRAT  Neurology  [facility]

## 2020-10-19 NOTE — HPI
"Jeremias Hall is a 75-year-old male with a past medical history of DVT on Eliquis, hyperlipidemia, hypertension, and coronary artery disease who presents to the emergency room [facility] with reports of bilateral upper extremity numbness.  Patient states approximately 1 week ago he began to experience left-sided visual disturbances prompting him to visit his ophthalmologist.  Patient states he was seen by his ophthalmologist and told he had a "Stroke in his left eye.  Patient states approximately 2 days ago while he was in [place] he began to Not feel right.  Patient states It was almost like I was spaced and wasn't all together.  Patient states he utilized his wife's supplemental oxygen at the time with improvement of symptoms.  Patient states that his wife is currently being treated for cancer at [facility].  Patient reports he returned back home to [place] yesterday morning.  He states yesterday evening he began to experience bilateral upper extremity numbness and pain.  Patient states onset of symptoms occurred when he was awoken out of his sleep with a cold sweat.  Patient states the symptoms lasted approximately 10-15 minutes.  Patient denies history of stroke.  Patient states he recalls similar symptoms in the past whenever he got his 1st cardiac stent. Denies CP, SOB, Fever, Vomiting, Diarrhea, Syncope, or Trauma. Patient states he is currently being treated with daily Eliquis for a left lower extremity DVT, initial diagnosis 2013 - states he was initially on Coumadin then switched to Eliquis.  Patient states he follows with Dr. Braga, cardiology-states he currently has a loop recorder in place that is due to be removed.  In the emergency room initial workup benign with the exception of a mildly elevated BNP.  CT head obtained revealing no acute intracranial abnormality.  Patient placed in observation on 10/18/2020 at approximately 10:00 p.m..  Patient reportedly lives at home with his wife, " denies use of supplemental oxygen or ambulatory assist devices.  Patient accompanied by his son and granddaughter during my initial interview and physical exam.

## 2020-10-19 NOTE — SUBJECTIVE & OBJECTIVE
Past Medical History:   Diagnosis Date    Anticoagulant long-term use     Clotting disorder     Coronary artery disease     Encounter for blood transfusion     Hypertension        Past Surgical History:   Procedure Laterality Date    CHOLECYSTECTOMY      CORONARY ARTERY BYPASS GRAFT      knee surgery ,back surgery(bilateral)         Review of patient's allergies indicates:  No Known Allergies    No current facility-administered medications on file prior to encounter.      Current Outpatient Medications on File Prior to Encounter   Medication Sig    hydroCHLOROthiazide (MICROZIDE) 12.5 mg capsule Take 12.5 mg by mouth once daily.    irbesartan (AVAPRO) 300 MG tablet Take 300 mg by mouth every evening.    apixaban (ELIQUIS) 5 mg Tab Take 5 mg by mouth 2 (two) times daily.    aspirin 81 MG Chew Take 81 mg by mouth once daily.    folic acid (FOLVITE) 1 MG tablet     sotalol (BETAPACE) 120 MG Tab Take 80 mg by mouth every 12 (twelve) hours.    tamsulosin (FLOMAX) 0.4 mg Cp24 Take 0.4 mg by mouth once daily.    [DISCONTINUED] azilsartan med-chlorthalidone (EDARBYCLOR) 40-12.5 mg Tab Take by mouth.    [DISCONTINUED] FLUZONE HIGH-DOSE , PF, 180 mcg/0.5 mL Syrg     [DISCONTINUED] pravastatin (PRAVACHOL) 80 MG tablet     [DISCONTINUED] valsartan (DIOVAN) 160 MG tablet Take 320 mg by mouth once daily.     Family History     Family history is unknown by patient.        Tobacco Use    Smoking status: Former Smoker     Quit date: 1980     Years since quittin.8    Smokeless tobacco: Never Used    Tobacco comment: smoked pipe   Substance and Sexual Activity    Alcohol use: Yes     Alcohol/week: 0.0 standard drinks     Comment: socially    Drug use: No    Sexual activity: Not on file     Review of Systems   Constitutional: Negative for activity change, appetite change, chills, fatigue and fever.   HENT: Negative for congestion, sinus pressure, sinus pain and sore throat.    Eyes: Positive for  visual disturbance. Negative for photophobia.   Respiratory: Negative for cough, choking, chest tightness, shortness of breath and wheezing.    Cardiovascular: Negative for chest pain, palpitations and leg swelling.   Gastrointestinal: Negative for abdominal distention, abdominal pain, blood in stool, constipation, diarrhea, nausea and vomiting.   Genitourinary: Negative for difficulty urinating, dysuria, flank pain and hematuria.   Musculoskeletal: Negative for back pain, gait problem and joint swelling.   Skin: Negative for rash and wound.   Neurological: Negative for dizziness, syncope, weakness, light-headedness, numbness and headaches.   Psychiatric/Behavioral: Negative for confusion. The patient is not nervous/anxious.      Objective:     Vital Signs (Most Recent):  Temp: 97.7 °F (36.5 °C) (10/18/20 2259)  Pulse: 61 (10/18/20 2259)  Resp: 17 (10/18/20 2259)  BP: (!) 158/77 (10/18/20 2259)  SpO2: 98 % (10/18/20 2259) Vital Signs (24h Range):  Temp:  [97.2 °F (36.2 °C)-97.7 °F (36.5 °C)] 97.7 °F (36.5 °C)  Pulse:  [50-61] 61  Resp:  [17-18] 17  SpO2:  [96 %-99 %] 98 %  BP: (141-195)/(75-98) 158/77     Weight: 113.5 kg (250 lb 3.6 oz)  Body mass index is 36.42 kg/m².    Physical Exam  Vitals signs and nursing note reviewed.   Constitutional:       General: He is not in acute distress.     Appearance: He is well-developed. He is obese. He is not diaphoretic.   HENT:      Head: Normocephalic and atraumatic.   Eyes:      General:         Right eye: No discharge.         Left eye: No discharge.      Pupils: Pupils are equal, round, and reactive to light.   Neck:      Musculoskeletal: Normal range of motion.      Vascular: No JVD.   Cardiovascular:      Rate and Rhythm: Normal rate and regular rhythm.      Heart sounds: No murmur.   Pulmonary:      Effort: Pulmonary effort is normal. No respiratory distress.      Breath sounds: Normal breath sounds. No wheezing or rales.   Chest:      Chest wall: No tenderness.    Abdominal:      General: Bowel sounds are normal. There is no distension.      Palpations: Abdomen is soft.      Tenderness: There is no abdominal tenderness. There is no guarding or rebound.   Genitourinary:     Comments: Exam Deferred     Musculoskeletal: Normal range of motion.         General: No tenderness or deformity.   Skin:     General: Skin is warm and dry.      Capillary Refill: Capillary refill takes 2 to 3 seconds.      Findings: No erythema or rash.   Neurological:      Mental Status: He is alert and oriented to person, place, and time.      Cranial Nerves: No cranial nerve deficit.      Sensory: No sensory deficit.      Comments: No neurological deficit noted upon my initial interview, physical exam.     Psychiatric:         Mood and Affect: Mood normal.         Behavior: Behavior normal.         Thought Content: Thought content normal.         Judgment: Judgment normal.           CRANIAL NERVES     CN III, IV, VI   Pupils are equal, round, and reactive to light.       Significant Labs:   CBC:   Recent Labs   Lab 10/18/20  1834   WBC 10.98   HGB 14.4   HCT 44.2        CMP:   Recent Labs   Lab 10/18/20  1834      K 4.3      CO2 27   GLU 87   BUN 14   CREATININE 1.2   CALCIUM 9.3   PROT 6.8   ALBUMIN 3.9   BILITOT 0.7   ALKPHOS 63   AST 20   ALT 17   ANIONGAP 10   EGFRNONAA 58*     Cardiac Markers:   Recent Labs   Lab 10/18/20  1834   *     COVID - Negative    Troponin:   Recent Labs   Lab 10/18/20  1834   TROPONINI 0.019     All pertinent labs within the past 24 hours have been reviewed.    Significant Imaging: I have reviewed all pertinent imaging results/findings within the past 24 hours.     CT head without contrast:  IMPRESSION:  Negative for acute intracranial pathology.    EKG obtained on 10/18/2020, impression as below:  Sinus bradycardia  Left axis deviation  Nonspecific ST and T wave abnormality  Abnormal ECG  When compared with ECG of 26-SEP-2017 13:34,  T wave  inversion no longer evident in Inferior leads

## 2020-10-19 NOTE — ASSESSMENT & PLAN NOTE
Resolution of symptoms noted upon admission    Antithrombotics for secondary stroke prevention: Antiplatelets: Aspirin: 81 mg daily    Statins for secondary stroke prevention and hyperlipidemia, if present:   Statins: Atorvastatin- 40 mg daily    Aggressive risk factor modification: HTN, HLD, Diet, Exercise, CAD     Rehab efforts: The patient has been evaluated by a stroke team provider and the therapy needs have been fully considered based off the presenting complaints and exam findings. The following therapy evaluations are needed: PT evaluate and treat, OT evaluate and treat, SLP evaluate and treat    Diagnostics ordered/pending: Carotid ultrasound to assess vasculature (no hemodynamically significant stenosis), HgbA1C to assess blood glucose levels (reviewed, consistent with prediabetes), Lipid Profile to assess cholesterol levels (reviewed), TTE to assess cardiac function/status (pending) , TSH to assess thyroid function (within normal limits), CTA head and neck (pending), MRI brain (pending)    VTE prophylaxis: None: Reason for No Pharmacological VTE Prophylaxis: Currently on anticoagulation    BP parameters: TIA: SBP <220 until imaging confirmation of no infarct      Neurology following.  Per neuro NP Dr. Braga has okayed MRI

## 2020-10-19 NOTE — PROGRESS NOTES
"[facility] Hospital Medicine  Progress Note    Patient Name: Jeremias Hall  MRN: [id number]  Patient Class: OP- Observation   Admission Date: 10/18/2020  Length of Stay: 0 days  Attending Physician: Naheed Santoyo MD  Primary Care Provider: Travis Go MD        Subjective:     Principal Problem:TIA (transient ischemic attack)        HPI:  Jeremias Hall is a 75-year-old male with a past medical history of DVT on Eliquis, hyperlipidemia, hypertension, and coronary artery disease who presents to the emergency room [facility] with reports of bilateral upper extremity numbness.  Patient states approximately 1 week ago he began to experience left-sided visual disturbances prompting him to visit his ophthalmologist.  Patient states he was seen by his ophthalmologist and told he had a "Stroke in his left eye.  Patient states approximately 2 days ago while he was in [place] he began to Not feel right.  Patient states It was almost like I was spaced and wasn't all together.  Patient states he utilized his wife's supplemental oxygen at the time with improvement of symptoms.  Patient states that his wife is currently being treated for cancer at [facility].  Patient reports he returned back home to [place] yesterday morning.  He states yesterday evening he began to experience bilateral upper extremity numbness and pain.  Patient states onset of symptoms occurred when he was awoken out of his sleep with a cold sweat.  Patient states the symptoms lasted approximately 10-15 minutes.  Patient denies history of stroke.  Patient states he recalls similar symptoms in the past whenever he got his 1st cardiac stent. Denies CP, SOB, Fever, Vomiting, Diarrhea, Syncope, or Trauma. Patient states he is currently being treated with daily Eliquis for a left lower extremity DVT, initial diagnosis 2013 - states he was initially on Coumadin then switched to Eliquis.  Patient states he follows with " Dr. Braga, cardiology-states he currently has a loop recorder in place that is due to be removed.  In the emergency room initial workup benign with the exception of a mildly elevated BNP.  CT head obtained revealing no acute intracranial abnormality.  Patient placed in observation on 10/18/2020 at approximately 10:00 p.m..  Patient reportedly lives at home with his wife, denies use of supplemental oxygen or ambulatory assist devices.  Patient accompanied by his son and granddaughter during my initial interview and physical exam.    Overview/Hospital Course:  No notes on file    Interval History:  Patient seen and examined.  Neurology nurse practitioner also in room.  Patient  Resting in bed.  Reports continued blurry vision to left lower vision field.  Otherwise no complaints.    Review of Systems   Constitutional: Negative for activity change, appetite change, chills, fatigue and fever.   HENT: Negative for congestion, sinus pressure, sinus pain and sore throat.    Eyes: Positive for visual disturbance. Negative for photophobia.   Respiratory: Negative for cough, choking, chest tightness, shortness of breath and wheezing.    Cardiovascular: Negative for chest pain, palpitations and leg swelling.   Gastrointestinal: Negative for abdominal distention, abdominal pain, blood in stool, constipation, diarrhea, nausea and vomiting.   Genitourinary: Negative for difficulty urinating, dysuria, flank pain and hematuria.   Musculoskeletal: Negative for back pain, gait problem and joint swelling.   Skin: Negative for rash and wound.   Neurological: Negative for dizziness, syncope, weakness, light-headedness, numbness and headaches.   Psychiatric/Behavioral: Negative for confusion. The patient is not nervous/anxious.      Objective:     Vital Signs (Most Recent):  Temp: 97.9 °F (36.6 °C) (10/19/20 1153)  Pulse: (!) 55 (10/19/20 1153)  Resp: 18 (10/19/20 1153)  BP: (!) 142/84 (10/19/20 1153)  SpO2: 97 % (10/19/20 1153) Vital  Signs (24h Range):  Temp:  [97 °F (36.1 °C)-97.9 °F (36.6 °C)] 97.9 °F (36.6 °C)  Pulse:  [50-61] 55  Resp:  [16-20] 18  SpO2:  [95 %-99 %] 97 %  BP: (111-195)/(70-98) 142/84     Weight: 113.4 kg (250 lb)  Body mass index is 36.92 kg/m².    Intake/Output Summary (Last 24 hours) at 10/19/2020 1454  Last data filed at 10/19/2020 0100  Gross per 24 hour   Intake --   Output 625 ml   Net -625 ml      Physical Exam  Vitals signs and nursing note reviewed.   Constitutional:       General: He is not in acute distress.     Appearance: He is well-developed. He is obese. He is not diaphoretic.   HENT:      Head: Normocephalic and atraumatic.   Eyes:      General:         Right eye: No discharge.         Left eye: No discharge.      Pupils: Pupils are equal, round, and reactive to light.   Neck:      Musculoskeletal: Normal range of motion.      Vascular: No JVD.   Cardiovascular:      Rate and Rhythm: Normal rate and regular rhythm.      Heart sounds: No murmur.   Pulmonary:      Effort: Pulmonary effort is normal. No respiratory distress.      Breath sounds: Normal breath sounds. No wheezing or rales.   Chest:      Chest wall: No tenderness.   Abdominal:      General: Bowel sounds are normal. There is no distension.      Palpations: Abdomen is soft.      Tenderness: There is no abdominal tenderness. There is no guarding or rebound.   Genitourinary:     Comments:     Musculoskeletal: Normal range of motion.         General: No tenderness or deformity.   Skin:     General: Skin is warm and dry.      Capillary Refill: Capillary refill takes less than 2 seconds.      Findings: No erythema or rash.   Neurological:      General: No focal deficit present.      Mental Status: He is alert and oriented to person, place, and time.      Cranial Nerves: No cranial nerve deficit.      Sensory: No sensory deficit.   Psychiatric:         Mood and Affect: Mood normal.         Behavior: Behavior normal.         Thought Content: Thought  content normal.         Judgment: Judgment normal.         Significant Labs: All pertinent labs within the past 24 hours have been reviewed.    Significant Imaging: I have reviewed and interpreted all pertinent imaging results/findings within the past 24 hours.      Assessment/Plan:      * TIA (transient ischemic attack)  Resolution of symptoms noted upon admission    Antithrombotics for secondary stroke prevention: Antiplatelets: Aspirin: 81 mg daily    Statins for secondary stroke prevention and hyperlipidemia, if present:   Statins: Atorvastatin- 40 mg daily    Aggressive risk factor modification: HTN, HLD, Diet, Exercise, CAD     Rehab efforts: The patient has been evaluated by a stroke team provider and the therapy needs have been fully considered based off the presenting complaints and exam findings. The following therapy evaluations are needed: PT evaluate and treat, OT evaluate and treat, SLP evaluate and treat    Diagnostics ordered/pending: Carotid ultrasound to assess vasculature (no hemodynamically significant stenosis), HgbA1C to assess blood glucose levels (reviewed, consistent with prediabetes), Lipid Profile to assess cholesterol levels (reviewed), TTE to assess cardiac function/status (pending) , TSH to assess thyroid function (within normal limits), CTA head and neck (pending), MRI brain (pending)    VTE prophylaxis: None: Reason for No Pharmacological VTE Prophylaxis: Currently on anticoagulation    BP parameters: TIA: SBP <220 until imaging confirmation of no infarct      Neurology following.  Per neuro NP Dr. Braga has okayed MRI            HLD (hyperlipidemia)  Atorvastatin 40 mg daily.              Elevated brain natriuretic peptide (BNP) level  Noted, denies SOB - utilize continuous telemetry monitoring.  Follow-up TTE      Current use of long term anticoagulation  Currently on Eliquis for DVT - continue - bleeding precautions.        Coronary artery disease involving native coronary artery  without angina pectoris  Patient with known CAD s/p stent placement, which is controlled Will continue ASA and Statin and monitor for S/Sx of angina/ACS. Continue to monitor on telemetry.         Essential hypertension  Chronic, uncontrolled.  Latest blood pressure and vitals reviewed-   Temp:  [97 °F (36.1 °C)-97.9 °F (36.6 °C)]   Pulse:  [50-61]   Resp:  [16-20]   BP: (111-195)/(70-98)   SpO2:  [95 %-99 %] .   Home meds for hypertension were reviewed and noted below. Hospital anti-hypertensive changes were made as shown below.  Hypertension Medications             hydroCHLOROthiazide (MICROZIDE) 12.5 mg capsule Take 12.5 mg by mouth once daily.    irbesartan (AVAPRO) 300 MG tablet Take 300 mg by mouth every evening.    sotalol (BETAPACE) 120 MG Tab Take 80 mg by mouth every 12 (twelve) hours.      Hospital Medications             hydroCHLOROthiazide tablet 12.5 mg Starting on 10/19/2020. 12.5 mg, Oral, Daily    irbesartan tablet 300 mg 300 mg, Oral, Nightly    sotalol split tablet 120 mg 120 mg, Oral, Every 12 hours        Will treat with PRN antihypertensives, as needed, to maintain BP less than 180/110 or if patient becomes symptomatic.  HOLD all home antihypertensives in the setting of TIA, allowing permissive HTN - No intervention for SBP <220 - please reintroduce antihypertensives to clinical regimen once clinically appropriate.            VTE Risk Mitigation (From admission, onward)         Ordered     apixaban tablet 5 mg  2 times daily      10/18/20 2252     IP VTE HIGH RISK PATIENT  Once      10/18/20 2252     Place sequential compression device  Until discontinued      10/18/20 2252     Reason for No Pharmacological VTE Prophylaxis  Once     Question:  Reasons:  Answer:  Already adequately anticoagulated on oral Anticoagulants    10/18/20 2252                Discharge Planning   ALBERT:      Code Status: Full Code   Is the patient medically ready for discharge?:     Reason for patient still in hospital  (select all that apply): Patient trending condition, Treatment, Imaging and Consult recommendations                     Naheed Santoyo MD  Department of Hospital Medicine   [facility]

## 2020-10-19 NOTE — PT/OT/SLP EVAL
Occupational Therapy   Evaluation and Discharge Note    Name: Jeremias Hall  MRN: [id number]  Admitting Diagnosis:  TIA (transient ischemic attack)      Recommendations:     Discharge Recommendations: home  Discharge Equipment Recommendations:  none  Barriers to discharge:  None    Assessment:     Jeremias Hall is a 75 y.o. male with a medical diagnosis of TIA (transient ischemic attack). Pt does have a monocular left lower quadrant field cut. OT educated pt on compensatory techniques such as visual scanning, use of adequate lighting and using caution in unfamiliar environments to reduce fall risk.  At this time, patient is functioning at their prior level of function and does not require further acute OT services.     Plan:     During this hospitalization, patient does not require further acute OT services.  Please re-consult if situation changes.    · Plan of Care Reviewed with: patient    Subjective     Chief Complaint: None  Patient/Family Comments/goals: to go home today    Occupational Profile:  Living Environment: Pt lives with his wife in a 2 story house with 3 ASIYA but lives on the first floor. He has a walk-in shower.  Previous level of function: Patient was Independent with all I/ADL's at home and in the community and driving.    Roles and Routines: Pt is a retired . He has been traveling to [place] recently where his wife is receiving cancer treatments.  Equipment Used at home:  none  Assistance upon Discharge: Family will assist pt at home if needed.      Pain/Comfort:  · Pain Rating 1: 0/10  · Pain Rating Post-Intervention 1: 0/10    Patients cultural, spiritual, Oriental orthodox conflicts given the current situation:      Objective:     Communicated with: Ondina, nurse prior to session.  Patient found supine with SCD, telemetry, bed alarm upon OT entry to room.    General Precautions: Standard, fall   Orthopedic Precautions:N/A   Braces: N/A     Occupational Performance:    Bed Mobility:     · Patient completed Scooting/Bridging with independence  · Patient completed Supine to Sit with independence  · Patient completed Sit to Supine with independence    Functional Mobility/Transfers:  · Patient completed Sit <> Stand Transfer with independence  with  no assistive device   · Patient completed Toilet Transfer Stand Pivot technique with independence with  no AD  · Patient completed  Shower Transfer Step Transfer technique with independence with no AD  · Functional Mobility: Pt walked independently in room and bathroom with good balance and safety without an assistive device.    Activities of Daily Living:  · Feeding:  independence    · Grooming: independence standing at sink  · Upper Body Dressing: independence to don/doff gown and fasten ties  · Lower Body Dressing: independence to don/doff B socks seated EOB  · Toileting: independence standing at toilet for clothing management    Cognitive/Visual Perceptual:  Cognitive/Psychosocial Skills:     -       Oriented to: Person, Place, Time and Situation   -       Follows Commands/attention:Follows multistep  commands  -       Communication: clear/fluent  -       Memory: No Deficits noted  -       Safety awareness/insight to disability: intact   -       Mood/Affect/Coping skills/emotional control: Appropriate to situation, Cooperative and Pleasant  Visual/Perceptual:      -Intact  acuity, R/L discrimination and motor planning praxis  -Impaired  tracking and visual field only in left lower quadrant with pt able to compensate with cues to do so    Physical Exam:  Balance:    -       normal dynamic and static standing  Postural examination/scapula alignment:    -       Rounded shoulders  Skin integrity: Visible skin intact  Edema:  None noted  Sensation:    -       Intact  Dominant hand:    -       right  Upper Extremity Range of Motion:     -       Right Upper Extremity: WNL  -       Left Upper Extremity: WNL  Upper Extremity Strength:    -       Right Upper  Extremity: WNL  -       Left Upper Extremity: WNL   Strength:    -       Right Upper Extremity: WNL  -       Left Upper Extremity: WNL  Fine Motor Coordination:    -       Intact  Left hand, finger to nose, Right hand, finger to nose, Left hand thumb/finger opposition skills, Right hand thumb/finger opposition skills, Left hand, manipulation of objects and Right hand, manipulation of objects  Gross motor coordination:   WFL    AMPAC 6 Click ADL:  AMPAC Total Score: 24    Treatment & Education:  OT ed pt on OT role & discharge recommendations.  OT ed pt on use of adequate lighting, keeping pathways clear of obstacles and use of visual scanning technique to prevent falls at home and while in hospital. Demonstration provided and pt verbalized understanding.  Pt verbalized understanding.  OT ed pt on fall risk and strongly advised pt to call for help for all OOB mobility.    Education:    Patient left HOB elevated with all lines intact, call button in reach, bed alarm on and Ondina, nurse notified    GOALS:   Multidisciplinary Problems     Occupational Therapy Goals     Not on file                History:     Past Medical History:   Diagnosis Date    Anticoagulant long-term use     Clotting disorder     Coronary artery disease     Encounter for blood transfusion     Hypertension        Past Surgical History:   Procedure Laterality Date    CHOLECYSTECTOMY      CORONARY ARTERY BYPASS GRAFT      knee surgery ,back surgery(bilateral)         Time Tracking:     OT Date of Treatment: 10/19/20  OT Start Time: 0920  OT Stop Time: 0935  OT Total Time (min): 15 min    Billable Minutes:Evaluation 15    BILL Boucher  10/19/2020

## 2020-10-19 NOTE — PLAN OF CARE
Problem: Physical Therapy Goal  Goal: Physical Therapy Goal  Description: Goals to be met by: 10/20/2020    Patient will increase functional independence with mobility by performin. Gait  x 250 feet with [place] using no AD     Outcome: Met

## 2020-10-19 NOTE — RESPIRATORY THERAPY
10/18/20 8612   Patient Assessment/Suction   Level of Consciousness (AVPU) alert   Respiratory Effort Unlabored   PRE-TX-O2   O2 Device (Oxygen Therapy) room air   SpO2 99 %   Pulse Oximetry Type Intermittent   $ Pulse Oximetry - Single Charge Pulse Oximetry - Single

## 2020-10-19 NOTE — H&P
"[facility] Medicine  History & Physical    Patient Name: Jeremias Hall  MRN: [id number]  Admission Date: 10/18/2020  Attending Physician: Alicia Coy MD   Primary Care Provider: Travis Go MD         Patient information was obtained from patient, relative(s), past medical records and ER records.     Subjective:     Principal Problem:TIA (transient ischemic attack)    Chief Complaint:   Chief Complaint   Patient presents with    Transient Ischemic Attack     kasey arm tingling and pain last night, vision changes last week        HPI: Jeremias Hall is a 75-year-old male with a past medical history of DVT on Eliquis, hyperlipidemia, hypertension, and coronary artery disease [facility].  Patient states approximately 1 week ago he began to experience left-sided visual disturbances prompting him to visit his ophthalmologist.  Patient states he was seen by his ophthalmologist and told he had a "Stroke in his left eye.  Patient states approximately 2 days ago while he was in [place] he began to Not feel right.  Patient states It was almost like I was spaced and wasn't all together.  Patient states he utilized his wife's supplemental oxygen at the time with improvement of symptoms.  Patient states that his wife is currently being treated for cancer at [facility].  Patient reports he returned back home to [place] yesterday morning.  He states yesterday evening he began to experience bilateral upper extremity numbness and pain.  Patient states onset of symptoms occurred when he was awoken out of his sleep with a cold sweat.  Patient states the symptoms lasted approximately 10-15 minutes.  Patient denies history of stroke.  Patient states he recalls similar symptoms in the past whenever he got his 1st cardiac stent. Denies CP, SOB, Fever, Vomiting, Diarrhea, Syncope, or Trauma. Patient " states he is currently being treated with daily Eliquis for a left lower extremity DVT, initial diagnosis 2013 - states he was initially on Coumadin then switched to Eliquis.  Patient states he follows with Dr. Braga, cardiology-states he currently has a loop recorder in place that is due to be removed.  In the emergency room initial workup benign with the exception of a mildly elevated BNP.  CT head obtained revealing no acute intracranial abnormality.  Patient placed in observation on 10/18/2020 at approximately 10:00 p.m..  Patient reportedly lives at home with his wife, denies use of supplemental oxygen or ambulatory assist devices.  Patient accompanied by his son and granddaughter during my initial interview and physical exam.    Past Medical History:   Diagnosis Date    Anticoagulant long-term use     Clotting disorder     Coronary artery disease     Encounter for blood transfusion     Hypertension        Past Surgical History:   Procedure Laterality Date    CHOLECYSTECTOMY      CORONARY ARTERY BYPASS GRAFT      knee surgery ,back surgery(bilateral)         Review of patient's allergies indicates:  No Known Allergies    No current facility-administered medications on file prior to encounter.      Current Outpatient Medications on File Prior to Encounter   Medication Sig    hydroCHLOROthiazide (MICROZIDE) 12.5 mg capsule Take 12.5 mg by mouth once daily.    irbesartan (AVAPRO) 300 MG tablet Take 300 mg by mouth every evening.    apixaban (ELIQUIS) 5 mg Tab Take 5 mg by mouth 2 (two) times daily.    aspirin 81 MG Chew Take 81 mg by mouth once daily.    folic acid (FOLVITE) 1 MG tablet     sotalol (BETAPACE) 120 MG Tab Take 80 mg by mouth every 12 (twelve) hours.    tamsulosin (FLOMAX) 0.4 mg Cp24 Take 0.4 mg by mouth once daily.    [DISCONTINUED] azilsartan med-chlorthalidone (EDARBYCLOR) 40-12.5 mg Tab Take by mouth.    [DISCONTINUED] FLUZONE HIGH-DOSE 2015-16, PF, 180 mcg/0.5 mL Syrg      [DISCONTINUED] pravastatin (PRAVACHOL) 80 MG tablet     [DISCONTINUED] valsartan (DIOVAN) 160 MG tablet Take 320 mg by mouth once daily.     Family History     Family history is unknown by patient.        Tobacco Use    Smoking status: Former Smoker     Quit date: 1980     Years since quittin.8    Smokeless tobacco: Never Used    Tobacco comment: smoked pipe   Substance and Sexual Activity    Alcohol use: Yes     Alcohol/week: 0.0 standard drinks     Comment: socially    Drug use: No    Sexual activity: Not on file     Review of Systems   Constitutional: Negative for activity change, appetite change, chills, fatigue and fever.   HENT: Negative for congestion, sinus pressure, sinus pain and sore throat.    Eyes: Positive for visual disturbance. Negative for photophobia.   Respiratory: Negative for cough, choking, chest tightness, shortness of breath and wheezing.    Cardiovascular: Negative for chest pain, palpitations and leg swelling.   Gastrointestinal: Negative for abdominal distention, abdominal pain, blood in stool, constipation, diarrhea, nausea and vomiting.   Genitourinary: Negative for difficulty urinating, dysuria, flank pain and hematuria.   Musculoskeletal: Negative for back pain, gait problem and joint swelling.   Skin: Negative for rash and wound.   Neurological: Negative for dizziness, syncope, weakness, light-headedness, numbness and headaches.   Psychiatric/Behavioral: Negative for confusion. The patient is not nervous/anxious.      Objective:     Vital Signs (Most Recent):  Temp: 97.7 °F (36.5 °C) (10/18/20 2259)  Pulse: 61 (10/18/20 2259)  Resp: 17 (10/18/20 2259)  BP: (!) 158/77 (10/18/20 2259)  SpO2: 98 % (10/18/20 2259) Vital Signs (24h Range):  Temp:  [97.2 °F (36.2 °C)-97.7 °F (36.5 °C)] 97.7 °F (36.5 °C)  Pulse:  [50-61] 61  Resp:  [17-18] 17  SpO2:  [96 %-99 %] 98 %  BP: (141-195)/(75-98) 158/77     Weight: 113.5 kg (250 lb 3.6 oz)  Body mass index is 36.42  kg/m².    Physical Exam  Vitals signs and nursing note reviewed.   Constitutional:       General: He is not in acute distress.     Appearance: He is well-developed. He is obese. He is not diaphoretic.   HENT:      Head: Normocephalic and atraumatic.   Eyes:      General:         Right eye: No discharge.         Left eye: No discharge.      Pupils: Pupils are equal, round, and reactive to light.   Neck:      Musculoskeletal: Normal range of motion.      Vascular: No JVD.   Cardiovascular:      Rate and Rhythm: Normal rate and regular rhythm.      Heart sounds: No murmur.   Pulmonary:      Effort: Pulmonary effort is normal. No respiratory distress.      Breath sounds: Normal breath sounds. No wheezing or rales.   Chest:      Chest wall: No tenderness.   Abdominal:      General: Bowel sounds are normal. There is no distension.      Palpations: Abdomen is soft.      Tenderness: There is no abdominal tenderness. There is no guarding or rebound.   Genitourinary:     Comments: Exam Deferred     Musculoskeletal: Normal range of motion.         General: No tenderness or deformity.   Skin:     General: Skin is warm and dry.      Capillary Refill: Capillary refill takes 2 to 3 seconds.      Findings: No erythema or rash.   Neurological:      Mental Status: He is alert and oriented to person, place, and time.      Cranial Nerves: No cranial nerve deficit.      Sensory: No sensory deficit.      Comments: No neurological deficit noted upon my initial interview, physical exam.     Psychiatric:         Mood and Affect: Mood normal.         Behavior: Behavior normal.         Thought Content: Thought content normal.         Judgment: Judgment normal.           CRANIAL NERVES     CN III, IV, VI   Pupils are equal, round, and reactive to light.       Significant Labs:   CBC:   Recent Labs   Lab 10/18/20  1834   WBC 10.98   HGB 14.4   HCT 44.2        CMP:   Recent Labs   Lab 10/18/20  1834      K 4.3      CO2 27    GLU 87   BUN 14   CREATININE 1.2   CALCIUM 9.3   PROT 6.8   ALBUMIN 3.9   BILITOT 0.7   ALKPHOS 63   AST 20   ALT 17   ANIONGAP 10   EGFRNONAA 58*     Cardiac Markers:   Recent Labs   Lab 10/18/20  1834   *     COVID - Negative    Troponin:   Recent Labs   Lab 10/18/20  1834   TROPONINI 0.019     All pertinent labs within the past 24 hours have been reviewed.    Significant Imaging: I have reviewed all pertinent imaging results/findings within the past 24 hours.     CT head without contrast:  IMPRESSION:  Negative for acute intracranial pathology.     EKG obtained on 10/18/2020, impression as below:  Sinus bradycardia  Left axis deviation  Nonspecific ST and T wave abnormality  Abnormal ECG  When compared with ECG of 26-SEP-2017 13:34,  T wave inversion no longer evident in Inferior leads    Assessment/Plan:     * TIA (transient ischemic attack)  Resolution of symptoms noted upon admission    Antithrombotics for secondary stroke prevention: Antiplatelets: Aspirin: 81 mg daily    Statins for secondary stroke prevention and hyperlipidemia, if present:   Statins: Atorvastatin- 40 mg daily    Aggressive risk factor modification: HTN, HLD, Diet, Exercise, CAD     Rehab efforts: The patient has been evaluated by a stroke team provider and the therapy needs have been fully considered based off the presenting complaints and exam findings. The following therapy evaluations are needed: PT evaluate and treat, OT evaluate and treat, SLP evaluate and treat    Diagnostics ordered/pending: Carotid ultrasound to assess vasculature, HgbA1C to assess blood glucose levels, Lipid Profile to assess cholesterol levels, TTE to assess cardiac function/status , TSH to assess thyroid function    VTE prophylaxis: None: Reason for No Pharmacological VTE Prophylaxis: Currently on anticoagulation    BP parameters: TIA: SBP <220 until imaging confirmation of no infarct      MRI not ordered secondary to LOOP recorder in place - defer to  neurology.  Neuro checks - neurology consulted.              HLD (hyperlipidemia)  Atorvastatin 40 mg daily.              Elevated brain natriuretic peptide (BNP) level  Noted, denies SOB - utilize continuous telemetry monitoring.        Current use of long term anticoagulation  Currently on Eliquis for DVT - continue - bleeding precautions.        Coronary artery disease involving native coronary artery without angina pectoris  Patient with known CAD s/p stent placement, which is controlled Will continue ASA and Statin and monitor for S/Sx of angina/ACS. Continue to monitor on telemetry.         Essential hypertension  Chronic, controlled.  Latest blood pressure and vitals reviewed-   Temp:  [97.2 °F (36.2 °C)-97.7 °F (36.5 °C)]   Pulse:  [50-61]   Resp:  [17-18]   BP: (141-195)/(75-98)   SpO2:  [96 %-99 %] .   Home meds for hypertension were reviewed and noted below. Hospital anti-hypertensive changes were made as shown below.  Hypertension Medications             hydroCHLOROthiazide (MICROZIDE) 12.5 mg capsule Take 12.5 mg by mouth once daily.    irbesartan (AVAPRO) 300 MG tablet Take 300 mg by mouth every evening.    sotalol (BETAPACE) 120 MG Tab Take 80 mg by mouth every 12 (twelve) hours.      Hospital Medications             hydroCHLOROthiazide tablet 12.5 mg Starting on 10/19/2020. 12.5 mg, Oral, Daily    irbesartan tablet 300 mg 300 mg, Oral, Nightly    sotalol split tablet 120 mg 120 mg, Oral, Every 12 hours        Will treat with PRN antihypertensives, as needed, to maintain BP less than 180/110 or if patient becomes symptomatic.  HOLD all home antihypertensives in the setting of TIA, allowing permissive HTN - No intervention for SBP <220 - please reintroduce antihypertensives to clinical regimen once clinically appropriate.            VTE Risk Mitigation (From admission, onward)         Ordered     apixaban tablet 5 mg  2 times daily      10/18/20 9039     IP VTE HIGH RISK PATIENT  Once      10/18/20  2252     Place sequential compression device  Until discontinued      10/18/20 2252     Reason for No Pharmacological VTE Prophylaxis  Once     Question:  Reasons:  Answer:  Already adequately anticoagulated on oral Anticoagulants    10/18/20 2252               Time spent seeing patient( greater than 1/2 spent in direct contact) : 65 minutes     Meggan Saunders NP  Department of Hospital Medicine   [facility]

## 2020-10-20 VITALS
DIASTOLIC BLOOD PRESSURE: 77 MMHG | HEART RATE: 55 BPM | HEIGHT: 69 IN | WEIGHT: 250 LBS | BODY MASS INDEX: 37.03 KG/M2 | SYSTOLIC BLOOD PRESSURE: 166 MMHG | TEMPERATURE: 96 F | OXYGEN SATURATION: 96 % | RESPIRATION RATE: 15 BRPM

## 2020-10-20 LAB
ALBUMIN SERPL BCP-MCNC: 3.3 G/DL (ref 3.5–5.2)
ALP SERPL-CCNC: 59 U/L (ref 55–135)
ALT SERPL W/O P-5'-P-CCNC: 12 U/L (ref 10–44)
ANION GAP SERPL CALC-SCNC: 11 MMOL/L (ref 8–16)
AST SERPL-CCNC: 16 U/L (ref 10–40)
BASOPHILS # BLD AUTO: 0.05 K/UL (ref 0–0.2)
BASOPHILS NFR BLD: 0.5 % (ref 0–1.9)
BILIRUB SERPL-MCNC: 0.5 MG/DL (ref 0.1–1)
BUN SERPL-MCNC: 13 MG/DL (ref 8–23)
CALCIUM SERPL-MCNC: 8.6 MG/DL (ref 8.7–10.5)
CHLORIDE SERPL-SCNC: 106 MMOL/L (ref 95–110)
CO2 SERPL-SCNC: 27 MMOL/L (ref 23–29)
CREAT SERPL-MCNC: 1.2 MG/DL (ref 0.5–1.4)
DIFFERENTIAL METHOD: ABNORMAL
EOSINOPHIL # BLD AUTO: 0.4 K/UL (ref 0–0.5)
EOSINOPHIL NFR BLD: 3.7 % (ref 0–8)
ERYTHROCYTE [DISTWIDTH] IN BLOOD BY AUTOMATED COUNT: 13.4 % (ref 11.5–14.5)
EST. GFR  (AFRICAN AMERICAN): >60 ML/MIN/1.73 M^2
EST. GFR  (NON AFRICAN AMERICAN): 58 ML/MIN/1.73 M^2
GLUCOSE SERPL-MCNC: 101 MG/DL (ref 70–110)
HCT VFR BLD AUTO: 42.3 % (ref 40–54)
HGB BLD-MCNC: 13.5 G/DL (ref 14–18)
IMM GRANULOCYTES # BLD AUTO: 0.03 K/UL (ref 0–0.04)
IMM GRANULOCYTES NFR BLD AUTO: 0.3 % (ref 0–0.5)
LYMPHOCYTES # BLD AUTO: 4.8 K/UL (ref 1–4.8)
LYMPHOCYTES NFR BLD: 51.6 % (ref 18–48)
MCH RBC QN AUTO: 29.7 PG (ref 27–31)
MCHC RBC AUTO-ENTMCNC: 31.9 G/DL (ref 32–36)
MCV RBC AUTO: 93 FL (ref 82–98)
MONOCYTES # BLD AUTO: 0.5 K/UL (ref 0.3–1)
MONOCYTES NFR BLD: 5.7 % (ref 4–15)
NEUTROPHILS # BLD AUTO: 3.6 K/UL (ref 1.8–7.7)
NEUTROPHILS NFR BLD: 38.2 % (ref 38–73)
NRBC BLD-RTO: 0 /100 WBC
PLATELET # BLD AUTO: 175 K/UL (ref 150–350)
PMV BLD AUTO: 10.5 FL (ref 9.2–12.9)
POTASSIUM SERPL-SCNC: 4.2 MMOL/L (ref 3.5–5.1)
PROT SERPL-MCNC: 5.9 G/DL (ref 6–8.4)
RBC # BLD AUTO: 4.54 M/UL (ref 4.6–6.2)
SODIUM SERPL-SCNC: 144 MMOL/L (ref 136–145)
WBC # BLD AUTO: 9.38 K/UL (ref 3.9–12.7)

## 2020-10-20 PROCEDURE — 94761 N-INVAS EAR/PLS OXIMETRY MLT: CPT

## 2020-10-20 PROCEDURE — 85025 COMPLETE CBC W/AUTO DIFF WBC: CPT

## 2020-10-20 PROCEDURE — 25000003 PHARM REV CODE 250: Performed by: NURSE PRACTITIONER

## 2020-10-20 PROCEDURE — 80053 COMPREHEN METABOLIC PANEL: CPT

## 2020-10-20 PROCEDURE — 36415 COLL VENOUS BLD VENIPUNCTURE: CPT

## 2020-10-20 PROCEDURE — G0378 HOSPITAL OBSERVATION PER HR: HCPCS

## 2020-10-20 RX ORDER — ATORVASTATIN CALCIUM 40 MG/1
40 TABLET, FILM COATED ORAL DAILY
Qty: 90 TABLET | Refills: 0 | Status: SHIPPED | OUTPATIENT
Start: 2020-10-21 | End: 2021-01-19 | Stop reason: RX

## 2020-10-20 RX ADMIN — TAMSULOSIN HYDROCHLORIDE 0.4 MG: 0.4 CAPSULE ORAL at 09:10

## 2020-10-20 RX ADMIN — ASPIRIN 81 MG 81 MG: 81 TABLET ORAL at 09:10

## 2020-10-20 RX ADMIN — APIXABAN 5 MG: 2.5 TABLET, FILM COATED ORAL at 09:10

## 2020-10-20 RX ADMIN — ATORVASTATIN CALCIUM 40 MG: 40 TABLET, FILM COATED ORAL at 09:10

## 2020-10-20 RX ADMIN — FOLIC ACID 1 MG: 1 TABLET ORAL at 09:10

## 2020-10-20 NOTE — DISCHARGE INSTRUCTIONS
Thank you for choosing [facility] for your medical care. The primary doctor who is taking care of you at the time of your discharge is Naheed Santoyo MD.     You were admitted to the hospital with TIA (transient ischemic attack).     Please note your discharge instructions, including diet/activity restrictions, follow-up appointments, and medication changes.  If you have any questions about your medical issues, prescriptions, or any other questions, please feel free to contact the [facility] Medicine Dept at [phone] and we will help.    If you are previously with Home health, outpatient PT/OT or under a therapy program, you are cleared to return to those programs.    Please direct all long term medication refills and follow up to your primary care provider, Travis Go MD. Thank you again for letting us take care of your health care needs.    Please note the following discharge instructions per your discharging physician-  Follow up with neurology in 2 days.  I placed a referral to vascular surgery for monitoring of left internal carotid artery (50% stenosis)  Hold sotalol given low heart rate, follow up with Dr. Braga to discuss if you should continue this medication.

## 2020-10-20 NOTE — PLAN OF CARE
Pt has been cleared for d/c by Dr. Santoyo. Pt is resting comfortably in bed, no c/o pain or s/s of distress. Will go over discharge teachings before d/c.

## 2020-10-20 NOTE — NURSING
Discharge instructions given to pt. Instructed on medicine regimen and f/u appts. Pt verbalizes understanding. IV and Telemetry removed. Pt waiting on ride. Should be here within the next 20 mins.  Amalia OLMEDO aware

## 2020-10-20 NOTE — CARE UPDATE
10/20/20 0749   PRE-TX-O2   O2 Device (Oxygen Therapy) room air   Oxygen Concentration (%) 21   SpO2 96 %   Pulse Oximetry Type Intermittent   $ Pulse Oximetry - Multiple Charge Pulse Oximetry - Multiple   Probe Placed On (Pulse Ox) finger   Pulse (!) 55   Resp 15   Positioning HOB elevated 30 degrees   POx

## 2020-10-20 NOTE — PLAN OF CARE
Patient was observed in bed with his eyes closed, VSS, Patient denies any pain or discomfort, HOB elevated, Safety & Fall precautions maintained, Neuro checks continue as ordered, general condition stable, no s/sx of acute distress observed, will continue to monitor.

## 2020-10-20 NOTE — PROGRESS NOTES
[facility]  Neurology  Progress Note    Patient Name: Jeremias Hall  MRN: [id number]  Admission Date: 10/18/2020  Hospital Length of Stay: 0 days  Code Status: Full Code   Attending Provider: Naheed Santoyo MD  Primary Care Physician: Travis Go MD   Principal Problem:TIA (transient ischemic attack)    Subjective:     Interval History: Patient seen this morning, continues to have left lower visual field deficit. Otherwise, doing well.     MRI brain wo contrast:  Impression:     Unremarkable study for the patient's age.    CTA head and neck   Impression:     The patient has 50% stenosis at the origin of the left internal carotid artery.  There is atherosclerotic plaque at the right carotid bifurcation without stenosis by NASCET criteria.  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  Aneurysm, AVM, mass effect or occlusion is not seen.    TTE with bubble: LVEF 55%, left atrium normal, bubble study negative for PFO    Current Neurological Medications:     Current Facility-Administered Medications   Medication Dose Route Frequency Provider Last Rate Last Dose    apixaban tablet 5 mg  5 mg Oral BID Meggan A. Daigrepont, NP   5 mg at 10/19/20 2114    aspirin chewable tablet 81 mg  81 mg Oral Daily Meggan A. Daigrepont, NP   81 mg at 10/19/20 0819    atorvastatin tablet 40 mg  40 mg Oral Daily Meggan A. Daigrepont, NP   40 mg at 10/19/20 0819    folic acid tablet 1 mg  1 mg Oral Daily Meggan A. Daigrepont, NP   1 mg at 10/19/20 0819    ondansetron injection 4 mg  4 mg Intravenous Q6H PRN Meggan A. Daigrepont, NP        sodium chloride 0.9% flush 10 mL  10 mL Intravenous PRN Meggan A. Daigrepont, NP        tamsulosin 24 hr capsule 0.4 mg  0.4 mg Oral Daily Meggan A. Daigrepont, NP   0.4 mg  at 10/19/20 0818       Review of Systems   As per HPI  Objective:     Vital Signs (Most Recent):  Temp: 96.2 °F (35.7 °C) (10/20/20 0717)  Pulse: (!) 55 (10/20/20 0749)  Resp: 15 (10/20/20 0749)  BP: (!) 166/77 (10/20/20 0717)  SpO2: 96 % (10/20/20 0749) Vital Signs (24h Range):  Temp:  [96.2 °F (35.7 °C)-97.9 °F (36.6 °C)] 96.2 °F (35.7 °C)  Pulse:  [50-57] 55  Resp:  [15-20] 15  SpO2:  [95 %-98 %] 96 %  BP: (107-166)/(58-84) 166/77     Weight: 113.4 kg (250 lb)  Body mass index is 36.92 kg/m².    Physical Exam    Vitals signs and nursing note reviewed.   Constitutional:       General: He is not in acute distress.     Appearance: He is well-developed. He is obese. He is not diaphoretic.   HENT:      Head: Normocephalic and atraumatic.   Eyes:      General:         Right eye: No discharge.         Left eye: No discharge.      Pupils: Pupils are equal, round, and reactive to light.   Neck:      Musculoskeletal: Normal range of motion.      Vascular: No JVD.   Cardiovascular:      Rate and Rhythm: Normal rate and regular rhythm.     Pulmonary:      Effort: Pulmonary effort is normal. No respiratory distress.     Chest:      Chest wall: No tenderness.   Abdominal:      General:  There is no distension.      Palpations: Abdomen is soft.      Tenderness: There is no abdominal tenderness. There is no guarding or rebound.       Musculoskeletal: Normal range of motion.         General: No tenderness or deformity.   Skin:     General: Skin is warm and dry.      Capillary Refill: Capillary refill takes 2 to 3 seconds.      Findings: No erythema or rash.      Psychiatric:         Mood and Affect: Mood normal.         Behavior: Behavior normal.         Thought Content: Thought content normal.         Judgment: Judgment normal.        Significant Labs:   BMP:   Recent Labs   Lab 10/18/20  1834 10/19/20  0111 10/20/20  0517   GLU 87 135* 101    141 144   K 4.3 3.5 4.2    104 106   CO2 27 26 27   BUN 14 13 13    CREATININE 1.2 1.2 1.2   CALCIUM 9.3 8.6* 8.6*   MG  --  2.1  --      CBC:   Recent Labs   Lab 10/18/20  1834 10/19/20  0111 10/20/20  0517   WBC 10.98 8.69 9.38   HGB 14.4 13.4* 13.5*   HCT 44.2 40.1 42.3    167 175     CMP:   Recent Labs   Lab 10/18/20  1834 10/19/20  0111 10/20/20  0517   GLU 87 135* 101    141 144   K 4.3 3.5 4.2    104 106   CO2 27 26 27   BUN 14 13 13   CREATININE 1.2 1.2 1.2   CALCIUM 9.3 8.6* 8.6*   MG  --  2.1  --    PROT 6.8 6.0 5.9*   ALBUMIN 3.9 3.3* 3.3*   BILITOT 0.7 0.5 0.5   ALKPHOS 63 62 59   AST 20 16 16   ALT 17 15 12   ANIONGAP 10 11 11   EGFRNONAA 58* 58* 58*       Significant Imaging: I have reviewed all pertinent imaging results/findings within the past 24 hours.    Assessment and Plan:  1. Central Retinal Artery Occlusion   -CTH without contrast negative for acute bleed  -Exam non-focal except blurry lower visual field in left eye   -Continue aspirin, Eliquis and statin therapy for stroke prevention   -Frequent neuro checks      2. R/o TIA/CVA  -as noted above   -MRI negative for acute intracranial process    -Obtain CTA head and neck   -TTE noted above   -CUS negative for stenosis/ occlusion      2. Hypertension   -at goal this morning   -IM following      3. H/O DVT   -Continue Eliquis 5mg BID      4. Hyperlipidemia   -LDL 76.2  -Continue static therapy for LDL goal less than 70    4. Carotid Artery Stenosis   -50 % stenosis noted on CTA head and neck to LICA   -F/u outpatient with vascular surgery   -Continue statin therapy for LDL goal less than 70     OK for patient to be discharged. Needs to f/u with outpatient vascular surgery for 50% stenosis noted on LICA on CTA head and neck   [facility] at [phone] within 2 weeks from discharge.  Stroke education was provided including stroke risk factors modification and any acute neurological changes including weakness, confusion, visual changes to come straight to  the ER.     All side effects of new medications were discussed with patient and/or next of kin and all questions were answered.       Active Diagnoses:    Diagnosis Date Noted POA    PRINCIPAL PROBLEM:  TIA (transient ischemic attack) [G45.9] 10/18/2020 Yes    Current use of long term anticoagulation [Z79.01] 10/18/2020 Not Applicable    Elevated brain natriuretic peptide (BNP) level [R79.89] 10/18/2020 Yes    HLD (hyperlipidemia) [E78.5] 10/18/2020 Yes    Essential hypertension [I10] 02/19/2017 Yes    Coronary artery disease involving native coronary artery without angina pectoris [I25.10] 02/19/2017 Yes      Problems Resolved During this Admission:       VTE Risk Mitigation (From admission, onward)         Ordered     apixaban tablet 5 mg  2 times daily      10/18/20 2252     IP VTE HIGH RISK PATIENT  Once      10/18/20 2252     Place sequential compression device  Until discontinued      10/18/20 2252     Reason for No Pharmacological VTE Prophylaxis  Once     Question:  Reasons:  Answer:  Already adequately anticoagulated on oral Anticoagulants    10/18/20 2252                Carley Santana DNP  Neurology  [facility]

## 2020-10-20 NOTE — RESPIRATORY THERAPY
10/19/20 6617   Patient Assessment/Suction   Level of Consciousness (AVPU) alert   Respiratory Effort Unlabored   PRE-TX-O2   O2 Device (Oxygen Therapy) room air   SpO2 97 %   Pulse Oximetry Type Intermittent   $ Pulse Oximetry - Multiple Charge Pulse Oximetry - Multiple

## 2020-10-20 NOTE — PLAN OF CARE
Pt is cleared form CM for DC.       10/20/20 1029   Final Note   Assessment Type Final Discharge Note   Anticipated Discharge Disposition Home   Hospital Follow Up  Appt(s) scheduled? Yes

## 2020-10-20 NOTE — PLAN OF CARE
Cm completed the assessment with pt and granddaughter at bedside.  Pt lives with spouse and independent in care.  PCP is .   Insurance verified as Medicare/[facility].  Pt denies diabetes, dialysis and coumadin.  Pt is on Eliquis.  Disposition:  Pt will discharge to home with family.  No needs verbalized at this time.  Pharmacy of choice is [facility].        10/19/20 0532   Discharge Assessment   Assessment Type Discharge Planning Assessment   Confirmed/corrected address and phone number on facesheet? Yes   Assessment information obtained from? Patient   Expected Length of Stay (days) 2   Communicated expected length of stay with patient/caregiver yes   Prior to hospitilization cognitive status: Alert/Oriented   Prior to hospitalization functional status: Independent   Current cognitive status: Alert/Oriented   Current Functional Status: Independent   Facility Arrived From: home   Lives With spouse   Able to Return to Prior Arrangements yes   Is patient able to care for self after discharge? Yes   Who are your caregiver(s) and their phone number(s)? [phone]   Patient's perception of discharge disposition home or selfcare   Readmission Within the Last 30 Days no previous admission in last 30 days   Patient currently being followed by outpatient case management? No   Patient currently receives any other outside agency services? No   Equipment Currently Used at Home none   Do you have any problems affording any of your prescribed medications? No   Is the patient taking medications as prescribed? yes   Does the patient have transportation home? Yes   Transportation Anticipated family or friend will provide   Dialysis Name and Scheduled days na   Does the patient receive services at the Coumadin Clinic? No  (Eliquis)   Discharge Plan A Home with family   Discharge Plan B Home with family   DME Needed Upon Discharge  none   Patient/Family in Agreement with Plan yes

## 2020-10-20 NOTE — PLAN OF CARE
10/19/20 1544   SHEFFIELD Message   Medicare Outpatient and Observation Notification regarding financial responsibility Explained to patient/caregiver   Date SHEFFIELD was signed 10/19/20   Time SHEFFIELD was signed [id number]

## 2020-10-22 ENCOUNTER — TELEPHONE (OUTPATIENT)
Dept: MEDSURG UNIT | Facility: HOSPITAL | Age: 75
End: 2020-10-22

## 2020-10-22 NOTE — HOSPITAL COURSE
Patient was observed by the hospital medicine service. Neurology was consulted. MRI negative for stroke. 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 TTE negative for PFO. Patient cleared for discharge on ASA, Eliquis, and statin per neurology. He will follow up with PCP, neuro, and vascular surgery. Patient with bradycardia while admitted. Asymptomatic, however does report fatigue he thinks may be from the bradycardia. Patient instructed to discuss this with his cardiologist Dr. Braga prior to resuming sotatolol.

## 2020-10-22 NOTE — DISCHARGE SUMMARY
"[facility] Hospital Medicine  Discharge Summary      Patient Name: Jeremias Hall  MRN: [id number]  Admission Date: 10/18/2020  Hospital Length of Stay: 0 days  Discharge Date and Time: 10/20/2020 12:50 PM  Attending Physician: No att. providers found   Discharging Provider: Naheed Santoyo MD  Primary Care Provider: Travis Go MD      HPI:   Jeremias Hall is a 75-year-old male with a past medical history of DVT on Eliquis, hyperlipidemia, hypertension, and coronary artery disease who presents to the emergency room [facility] with reports of bilateral upper extremity numbness.  Patient states approximately 1 week ago he began to experience left-sided visual disturbances prompting him to visit his ophthalmologist.  Patient states he was seen by his ophthalmologist and told he had a "Stroke in his left eye.  Patient states approximately 2 days ago while he was in [place] he began to Not feel right.  Patient states It was almost like I was spaced and wasn't all together.  Patient states he utilized his wife's supplemental oxygen at the time with improvement of symptoms.  Patient states that his wife is currently being treated for cancer at [facility].  Patient reports he returned back home to [place] yesterday morning.  He states yesterday evening he began to experience bilateral upper extremity numbness and pain.  Patient states onset of symptoms occurred when he was awoken out of his sleep with a cold sweat.  Patient states the symptoms lasted approximately 10-15 minutes.  Patient denies history of stroke.  Patient states he recalls similar symptoms in the past whenever he got his 1st cardiac stent. Denies CP, SOB, Fever, Vomiting, Diarrhea, Syncope, or Trauma. Patient states he is currently being treated with daily Eliquis for a left lower extremity DVT, initial diagnosis 2013 - states he was initially on Coumadin then switched to Eliquis.  Patient states he follows with " Dr. Braga, cardiology-states he currently has a loop recorder in place that is due to be removed.  In the emergency room initial workup benign with the exception of a mildly elevated BNP.  CT head obtained revealing no acute intracranial abnormality.  Patient placed in observation on 10/18/2020 at approximately 10:00 p.m..  Patient reportedly lives at home with his wife, denies use of supplemental oxygen or ambulatory assist devices.  Patient accompanied by his son and granddaughter during my initial interview and physical exam.    * No surgery found *      Hospital Course:   Patient was observed by the hospital medicine service. Neurology was consulted. MRI negative for stroke. 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 TTE negative for PFO. Patient cleared for discharge on ASA, Eliquis, and statin per neurology. He will follow up with PCP, neuro, and vascular surgery. Patient with bradycardia while admitted. Asymptomatic, however does report fatigue he thinks may be from the bradycardia. Patient instructed to discuss this with his cardiologist Dr. Braga prior to resuming sotatolol.     Consults:   Consults (From admission, onward)        Status Ordering Provider     Inpatient consult to Registered Dietitian/Nutritionist  Once     Provider:  (Not yet assigned)    Completed DU SANTIAGO     IP consult to case management/social work  Once     Provider:  (Not yet assigned)    Completed DU SANTIAGO.          No new Assessment & Plan notes have been filed under this hospital service since the last note was generated.  Service: Hospital Medicine    Final Active Diagnoses:    Diagnosis Date Noted POA    PRINCIPAL PROBLEM:  TIA (transient ischemic attack) [G45.9] 10/18/2020 Yes    Current use of long term anticoagulation [Z79.01] 10/18/2020 Not Applicable    Elevated brain natriuretic peptide (BNP) level [R79.89] 10/18/2020 Yes    HLD (hyperlipidemia)  [E78.5] 10/18/2020 Yes    Essential hypertension [I10] 02/19/2017 Yes    Coronary artery disease involving native coronary artery without angina pectoris [I25.10] 02/19/2017 Yes      Problems Resolved During this Admission:       Discharged Condition: good    Disposition: Home or Self Care    Follow Up:  Follow-up Information     Deyvi Puckett MD In 2 days.    Specialties: Vascular Neurology, Neurology  Why: cm left message via phone to call and schedule pt  Contact information:  [address] [facility] [address]  [phone]             Travis Go MD In 1 week.    Specialty: Internal Medicine  Why: hosp f./u on Tuesday 10/27/2020 @ 2:30pm  Contact information:  [address] [place] [address]  [phone]             Bennie Logan MD.    Specialty: Cardiothoracic Surgery  Why: m spoke with Naheed, she will contact pt  Contact information:  [address] [facility] [address]  [phone]                 Patient Instructions:      Ambulatory referral/consult to Vascular Surgery   Standing Status: Future   Referral Priority: Routine Referral Type: Consultation   Referral Reason: Specialty Services Required   Requested Specialty: Vascular Surgery   Number of Visits Requested: 1     Diet Cardiac   Order Comments: See Stroke Patient Education Guide Booklet for details.     Call 911 for any of the following:   Order Comments: Call 911  right away if any of the following warning signs come on suddenly, even if the symptoms only last for a few minutes. With stroke, timing is very important.   - Warning Signs of Stroke:  - Weakness: You may feel a sudden weakness, tingling or loss of feeling on one side of your face or body.  - Vision Problems: You may have sudden double vision or trouble seeing in one or both eyes.  - Speech Problems: You may have sudden trouble talking, slured speech, or problems understanding others.  - Headache: You may have sudden, severe headache.  -  Movement Problems: You may experience dizziness, a feeling of spinning, a loss of balance, a feeling of falling or blackouts.       Significant Diagnostic Studies: Labs:   Lipid Panel   Lab Results   Component Value Date    CHOL 137 10/19/2020    HDL 28 (L) 10/19/2020    LDLCALC 76.2 10/19/2020    TRIG 164 (H) 10/19/2020    CHOLHDL 20.4 10/19/2020   , Troponin   Recent Labs   Lab 10/19/20  0111 10/19/20  1111 10/19/20  1708   TROPONINI <0.006 <0.006 0.009    and A1C:   Recent Labs   Lab 10/19/20  0111   HGBA1C 5.7*     Radiology Results (last 7 days)    Procedure Component Value Units Date/Time   CTA Head and Neck (xpd) [[id number]] Resulted: 10/19/20 1708   Order Status: Completed Updated: 10/19/20 1710   Narrative:     EXAMINATION:   CTA HEAD AND NECK (XPD)     CLINICAL HISTORY:   TIA, initial exam;     TECHNIQUE:   Non contrast low dose axial images were obtained through the head.  CT angiogram was performed from the level of the jake to the top of the head following the IV administration of 75mL of Omnipaque 350.   Sagittal and coronal reconstructions and maximum intensity projection reconstructions were performed. Arterial stenosis percentages are based on NASCET measurement criteria.     COMPARISON:   None     FINDINGS:   There is only minor atherosclerotic plaque seen in the arch of the aorta.  Significant stenosis at the origin of the vessels is not seen.  There is good flow into the right brachiocephalic artery and into the right subclavian and common carotid artery.  There is atherosclerotic plaque noted at the right aortic bifurcation however the patient does have a prominent carotid bulb and therefore significant stenosis by NASCET criteria is not demonstrated.  There is good flow in the right internal carotid artery through the siphon to the [id number]-Ewyfrn.     On the left there is good flow in the left common carotid artery to the carotid bifurcation.  There is atherosclerotic plaque with  calcification and 50% stenosis noted at the origin of the left internal carotid artery.  There is then good flow in the internal carotid artery to the carotid siphons and the [id number]-wf-[id number].     The patient has rather small distal vertebral arteries bilaterally.  The basilar artery therefore is quite small as well.  There is in fact flow to the joddnn-dj-Jhrwmu and into the superior cerebellar arteries the.  The posterior cerebral artery is supplied by the posterior communicating artery from the internal carotid circulation on both sides.     There is good flow in both anterior cerebral arteries, both middle cerebral arteries and both posterior cerebral arteries.  There is intact flow to the periphery.  No aneurysm is seen.  No abrupt cut off or stenosis is identified proximally.  Distally no AVM or mass effect is seen and abrupt cut off vessel is not demonstrated.    Impression:       The patient has 50% stenosis at the origin of the left internal carotid artery.  There is atherosclerotic plaque at the right carotid bifurcation without stenosis by NASCET criteria.  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  Aneurysm, AVM, mass effect or occlusion is not seen.       Electronically signed by: Kana Farah MD   Date: 10/19/2020   Time: 17:08   MRI Brain Without Contrast [[id number]] Resulted: 10/19/20 1702   Order Status: Completed Updated: 10/19/20 1705   Narrative:     EXAMINATION:   MRI BRAIN WITHOUT CONTRAST     CLINICAL HISTORY:   TIA, initial exam;     TECHNIQUE:   Multiplanar multisequence MR imaging of the brain was performed without contrast.     COMPARISON:   CT head 10/18/2020     FINDINGS:   There is no restricted diffusion suggesting acute infarct.  The craniocervical junction is within normal  limits in appearance.  There is normal vascular flow void in major, central intracranial vessels.  Ventricles, sulci, fissures are unremarkable appearance for the patient's age and there is just very slight increased signal in white matter on the FLAIR sequence suggesting very mild microvascular ischemic change not unusual for the patient's age.  No intracranial mass or mass effect.    Impression:       Unremarkable study for the patient's age.       Electronically signed by: Antonietta Israel MD   Date: 10/19/2020   Time: 17:02   US Carotid Bilateral [[id number]] Resulted: 10/19/20 1403   Order Status: Completed Updated: 10/19/20 1405   Narrative:     EXAMINATION:   US CAROTID BILATERAL     CLINICAL HISTORY:   Velocities evaluation;     TECHNIQUE:   Grayscale and color Doppler ultrasound examination of the carotid and vertebral artery systems bilaterally.  Stenosis estimates are per the NASCET measurement criteria.     COMPARISON:   Carotid ultrasound of February 18, 2017     FINDINGS:   Right:     Internal Carotid Artery (ICA) peak systolic velocity 55 cm/sec     ICA/CCA peak systolic velocity ratio: 1.1     Plaque formation: Minor     Vertebral artery: Antegrade flow and normal waveform.     Left:     Internal Carotid Artery (ICA)  peak systolic velocity 60 cm/sec     ICA/CCA peak systolic velocity ratio: 1.1     Plaque formation: Minor     Vertebral artery: Antegrade flow and normal waveform.    Impression:       No evidence of a hemodynamically significant carotid bifurcation stenosis.       Electronically signed by: Kana Farah MD   Date: 10/19/2020   Time: 14:03   X-Ray Chest AP Portable [[id number]] Resulted: 10/19/20 0908   Order Status: Completed Updated: 10/19/20 0910   Narrative:     EXAMINATION:   XR CHEST AP PORTABLE     CLINICAL HISTORY:   arm pain;     TECHNIQUE:   Single frontal view of the chest was performed.     COMPARISON:   09/25/2017     FINDINGS:   The cardiomediastinal silhouette is stable.   Loop recorder projects over the left chest.  Lungs appear clear of infiltrate and there is no pleural effusion.  Left basilar aeration is improved compared to the prior exam.    Impression:       No acute cardiopulmonary disease.       Electronically signed by: Antonietta Israel MD   Date: 10/19/2020   Time: 09:08   CT Head Without Contrast [[id number]] Resulted: 10/19/20 0907   Order Status: Completed Updated: 10/19/20 0909   Narrative:     EXAMINATION:   CT HEAD WITHOUT CONTRAST     CLINICAL HISTORY:   TIA, initial exam;     TECHNIQUE:   Low dose axial images were obtained through the head.  Coronal and sagittal reformations were also performed. Contrast was not administered.     COMPARISON:   None.     FINDINGS:   There is mild dilatation of ventricles, sulci, fissures.  There is decreased density in white matter suggesting microvascular ischemic change.  There is no acute intracranial hemorrhage.  No intracranial mass effect.  No acute major vascular territory infarct.  Note is made that MRI is typically more sensitive than CT particular for detection of early or small nonhemorrhagic infarcts.  The calvarium appears intact, mastoids well pneumatized, visualized paranasal sinuses essentially clear    Impression:       No acute intracranial findings     Final read       Electronically signed by: Antonietta Israel MD   Date: 10/19/2020   Time: 09:07       TTE Conclusion    · There is left ventricular concentric remodeling.  · The estimated PA systolic pressure is 22 mmHg.  · The left ventricle is normal in size with normal systolic function. The estimated ejection fraction is 55%.  · Normal left ventricular diastolic function.  · Low normal right ventricular systolic function.  · Mild aortic regurgitation.  · Mild mitral regurgitation.  · Normal central venous pressure (3 mmHg).  · No PFO on color flow and saline bubble study. No obvious intracardiac masses or thrombi         Pending Diagnostic Studies:     None          Medications:  Reconciled Home Medications:      Medication List      START taking these medications    atorvastatin 40 MG tablet  Commonly known as: LIPITOR  Take 1 tablet (40 mg total) by mouth once daily.        CONTINUE taking these medications    aspirin 81 MG Chew  Take 81 mg by mouth once daily. Takes at night     ELIQUIS 5 mg Tab  Generic drug: apixaban  Take 5 mg by mouth 2 (two) times daily.     folic acid 1 MG tablet  Commonly known as: FOLVITE     hydroCHLOROthiazide 12.5 mg capsule  Commonly known as: MICROZIDE  Take 12.5 mg by mouth once daily.     irbesartan 300 MG tablet  Commonly known as: AVAPRO  Take 300 mg by mouth every evening.     sotaloL 120 MG Tab  Commonly known as: BETAPACE  Take 80 mg by mouth every 12 (twelve) hours.  Notes to patient: Given your low heart rate, I would not take this medication until you discuss it with Dr. Braga     tamsulosin 0.4 mg Cap  Commonly known as: FLOMAX  Take 0.4 mg by mouth once daily.        STOP taking these medications    EDARBYCLOR 40-12.5 mg Tab  Generic drug: azilsartan med-chlorthalidone     FLUZONE HIGH-DOSE 2015-16 (PF) 180 mcg/0.5 mL Syrg  Generic drug: flu vac [id number](65yr,up)(PF)     pravastatin 80 MG tablet  Commonly known as: PRAVACHOL     valsartan 160 MG tablet  Commonly known as: DIOVAN            Indwelling Lines/Drains at time of discharge:   Lines/Drains/Airways     None                 Time spent on the discharge of patient: 35 minutes  Patient was seen and examined on the date of discharge and determined to be suitable for discharge.         Naheed Santoyo MD  Department of Hospital Medicine  [facility]

## 2020-11-04 ENCOUNTER — CLINICAL SUPPORT (OUTPATIENT)
Dept: FAMILY MEDICINE | Facility: CLINIC | Age: 75
End: 2020-11-04
Payer: MEDICARE

## 2020-11-04 VITALS
TEMPERATURE: 99 F | HEIGHT: 70 IN | HEART RATE: 64 BPM | DIASTOLIC BLOOD PRESSURE: 96 MMHG | RESPIRATION RATE: 16 BRPM | SYSTOLIC BLOOD PRESSURE: 178 MMHG | WEIGHT: 244 LBS | BODY MASS INDEX: 34.93 KG/M2 | OXYGEN SATURATION: 97 %

## 2020-11-04 DIAGNOSIS — E78.00 HYPERCHOLESTEROLEMIA: ICD-10-CM

## 2020-11-04 DIAGNOSIS — I47.10 PAROXYSMAL SVT (SUPRAVENTRICULAR TACHYCARDIA): ICD-10-CM

## 2020-11-04 DIAGNOSIS — N40.0 BENIGN PROSTATIC HYPERPLASIA, UNSPECIFIED WHETHER LOWER URINARY TRACT SYMPTOMS PRESENT: ICD-10-CM

## 2020-11-04 DIAGNOSIS — I10 HTN, GOAL BELOW 130/80: Primary | ICD-10-CM

## 2020-11-04 PROCEDURE — 99202 PR OFFICE/OUTPT VISIT, NEW, LEVL II, 15-29 MIN: ICD-10-PCS | Mod: S$GLB,,, | Performed by: INTERNAL MEDICINE

## 2020-11-04 PROCEDURE — 99202 OFFICE O/P NEW SF 15 MIN: CPT | Mod: S$GLB,,, | Performed by: INTERNAL MEDICINE

## 2020-11-04 NOTE — PROGRESS NOTES
Subjective:       Patient ID: Jeremias Hall is a 75 y.o. male.    Chief Complaint: Blood Pressure Check and Follow-up (hospitalization)    Pt was admitted to [facility] for a TIA. Discharged on 10/22/2020, on no meds restarted from home meds, at that time. BP's very elevated since, with systolic at home being over 170mmHg every time. Pt tried restarting the meds as prior but the weird head symptoms returned and had the visual sx more on the Left eye. No diplopia, didn't last long, more like a slight blur. Episodic. No meds for last 6 days, only took ASA81 and Folate. Took Eliquis 5mg today and Avapro 300mg, this AM. Needs to go back on all meds as prior with close monitoring of BP at home and here.  His digital sphygmo reading was approx 10-12mmHg higher than our aneroid reading. Pt is in NSR, no gross signs of bleeding.    Follow-up      Review of Systems   Constitutional: Negative.    HENT: Negative.  Negative for drooling and mouth sores.    Eyes: Negative.    Respiratory: Negative.  Negative for stridor.    Cardiovascular: Negative.    Gastrointestinal: Negative.  Negative for rectal pain.   Endocrine: Negative.    Genitourinary: Negative.  Negative for decreased urine volume and enuresis.   Musculoskeletal: Negative.    Integumentary:  Negative.   Allergic/Immunologic: Negative for frequent infections.   Psychiatric/Behavioral: Negative.    All other systems reviewed and are negative.        Objective:      Physical Exam  Vitals signs and nursing note reviewed.   Constitutional:       Appearance: Normal appearance. He is obese.   HENT:      Head: Normocephalic and atraumatic.      Right Ear: Tympanic membrane, ear canal and external ear normal. There is no impacted cerumen.      Left Ear: Tympanic membrane, ear canal and external ear normal. There is no impacted cerumen.      Nose: No congestion or rhinorrhea.      Mouth/Throat:      Mouth: Mucous membranes are moist.      Pharynx: No oropharyngeal exudate or  posterior oropharyngeal erythema.   Eyes:      General: No scleral icterus.        Right eye: No discharge.         Left eye: No discharge.      Extraocular Movements: Extraocular movements intact.      Conjunctiva/sclera: Conjunctivae normal.      Pupils: Pupils are equal, round, and reactive to light.   Neck:      Musculoskeletal: Normal range of motion and neck supple. No neck rigidity or muscular tenderness.      Vascular: No carotid bruit.   Cardiovascular:      Rate and Rhythm: Normal rate and regular rhythm.      Pulses: Normal pulses.      Heart sounds: Normal heart sounds. No murmur. No friction rub. No gallop.    Pulmonary:      Effort: Pulmonary effort is normal. No respiratory distress.      Breath sounds: Normal breath sounds. No stridor. No wheezing, rhonchi or rales.   Chest:      Chest wall: No tenderness.   Abdominal:      General: Abdomen is flat. Bowel sounds are normal. There is no distension.      Palpations: Abdomen is soft. There is no mass.      Tenderness: There is no abdominal tenderness. There is no right CVA tenderness, left CVA tenderness, guarding or rebound.      Hernia: No hernia is present.   Genitourinary:     Penis: Normal.       Scrotum/Testes: Normal.      Prostate: Normal.      Rectum: Normal. Guaiac result negative.   Musculoskeletal: Normal range of motion.         General: No swelling, tenderness, deformity or signs of injury.      Right lower leg: No edema.      Left lower leg: No edema.   Lymphadenopathy:      Cervical: No cervical adenopathy.   Skin:     General: Skin is warm and dry.      Capillary Refill: Capillary refill takes 2 to 3 seconds.      Coloration: Skin is not jaundiced or pale.      Findings: No bruising, erythema, lesion or rash.   Neurological:      General: No focal deficit present.      Mental Status: He is alert and oriented to person, place, and time. Mental status is at baseline.      Cranial Nerves: No cranial nerve deficit.      Sensory: No sensory  deficit.      Motor: No weakness.      Coordination: Coordination normal.      Gait: Gait normal.      Deep Tendon Reflexes: Reflexes normal.   Psychiatric:         Mood and Affect: Mood normal.         Behavior: Behavior normal.         Thought Content: Thought content normal.         Judgment: Judgment normal.         Assessment:       1. HTN, goal below 130/80    2. Paroxysmal SVT (supraventricular tachycardia)    3. Hypercholesterolemia    4. Benign prostatic hyperplasia, unspecified whether lower urinary tract symptoms present        Plan:       Pt instructed to check BP's at least 4 times a day, starting upon arising, 4 hrs after Am meds and 2 more times during the day or when have sx. Pt is very well aware of the sx and symptoms to watch for and is to seek medical attention in case these appear.

## 2020-11-11 ENCOUNTER — OFFICE VISIT (OUTPATIENT)
Dept: FAMILY MEDICINE | Facility: CLINIC | Age: 75
End: 2020-11-11
Payer: MEDICARE

## 2020-11-11 VITALS
DIASTOLIC BLOOD PRESSURE: 76 MMHG | TEMPERATURE: 98 F | RESPIRATION RATE: 18 BRPM | BODY MASS INDEX: 35.07 KG/M2 | SYSTOLIC BLOOD PRESSURE: 136 MMHG | HEART RATE: 60 BPM | HEIGHT: 70 IN | OXYGEN SATURATION: 98 % | WEIGHT: 245 LBS

## 2020-11-11 DIAGNOSIS — E78.00 HYPERCHOLESTEROLEMIA: ICD-10-CM

## 2020-11-11 DIAGNOSIS — I10 HTN, GOAL BELOW 130/80: Primary | ICD-10-CM

## 2020-11-11 PROCEDURE — 99213 PR OFFICE/OUTPT VISIT, EST, LEVL III, 20-29 MIN: ICD-10-PCS | Mod: 25,S$GLB,, | Performed by: INTERNAL MEDICINE

## 2020-11-11 PROCEDURE — 90694 VACC AIIV4 NO PRSRV 0.5ML IM: CPT | Mod: S$GLB,,, | Performed by: INTERNAL MEDICINE

## 2020-11-11 PROCEDURE — G0008 FLU VACCINE - QUADRIVALENT - ADJUVANTED: ICD-10-PCS | Mod: S$GLB,,, | Performed by: INTERNAL MEDICINE

## 2020-11-11 PROCEDURE — G0008 ADMIN INFLUENZA VIRUS VAC: HCPCS | Mod: S$GLB,,, | Performed by: INTERNAL MEDICINE

## 2020-11-11 PROCEDURE — 99213 OFFICE O/P EST LOW 20 MIN: CPT | Mod: 25,S$GLB,, | Performed by: INTERNAL MEDICINE

## 2020-11-11 PROCEDURE — 90694 FLU VACCINE - QUADRIVALENT - ADJUVANTED: ICD-10-PCS | Mod: S$GLB,,, | Performed by: INTERNAL MEDICINE

## 2020-11-11 NOTE — PROGRESS NOTES
Subjective:       Patient ID: Jeremias Hall is a 75 y.o. male.    Chief Complaint: Hypertension    Pt here for follow up of HTN management. Once his prior therapy was resumed, his BP is down to the goal values. Since 11/5/2020 his systolic BP has been averaging below 135mmHg at home. BP better here today was 136/76 on first reading. His tx has not had enough time to show maximal effect yet. Expect improvement. Negative ROS with regards to CV/CNS sx.    Review of Systems   HENT: Negative for drooling and mouth sores.    Respiratory: Negative for stridor.    Gastrointestinal: Negative for rectal pain.   Genitourinary: Negative for decreased urine volume and enuresis.   Allergic/Immunologic: Negative for frequent infections.   All other systems reviewed and are negative.        Objective:      Physical Exam  Vitals signs and nursing note reviewed.   Constitutional:       Appearance: Normal appearance. He is obese.   HENT:      Head: Normocephalic and atraumatic.      Right Ear: Tympanic membrane, ear canal and external ear normal. There is no impacted cerumen.      Left Ear: Tympanic membrane, ear canal and external ear normal. There is no impacted cerumen.      Nose: No congestion or rhinorrhea.      Mouth/Throat:      Mouth: Mucous membranes are moist.      Pharynx: No oropharyngeal exudate or posterior oropharyngeal erythema.   Eyes:      General: No scleral icterus.        Right eye: No discharge.         Left eye: No discharge.      Extraocular Movements: Extraocular movements intact.      Conjunctiva/sclera: Conjunctivae normal.      Pupils: Pupils are equal, round, and reactive to light.   Neck:      Musculoskeletal: Normal range of motion and neck supple. No neck rigidity or muscular tenderness.      Vascular: No carotid bruit.   Cardiovascular:      Rate and Rhythm: Normal rate and regular rhythm.      Pulses: Normal pulses.      Heart sounds: Normal heart sounds. No murmur. No friction rub. No gallop.     Pulmonary:      Effort: Pulmonary effort is normal. No respiratory distress.      Breath sounds: Normal breath sounds. No stridor. No wheezing, rhonchi or rales.   Chest:      Chest wall: No tenderness.   Abdominal:      General: Abdomen is flat. Bowel sounds are normal. There is no distension.      Palpations: Abdomen is soft. There is no mass.      Tenderness: There is no abdominal tenderness. There is no right CVA tenderness, left CVA tenderness, guarding or rebound.      Hernia: No hernia is present.   Genitourinary:     Penis: Normal.       Scrotum/Testes: Normal.      Prostate: Normal.      Rectum: Normal. Guaiac result negative.   Musculoskeletal: Normal range of motion.         General: No swelling, tenderness, deformity or signs of injury.      Right lower leg: No edema.      Left lower leg: No edema.   Lymphadenopathy:      Cervical: No cervical adenopathy.   Skin:     General: Skin is warm and dry.      Capillary Refill: Capillary refill takes 2 to 3 seconds.      Coloration: Skin is not jaundiced or pale.      Findings: No bruising, erythema, lesion or rash.   Neurological:      General: No focal deficit present.      Mental Status: He is alert and oriented to person, place, and time. Mental status is at baseline.      Cranial Nerves: No cranial nerve deficit.      Sensory: No sensory deficit.      Motor: No weakness.      Coordination: Coordination normal.      Gait: Gait normal.      Deep Tendon Reflexes: Reflexes normal.   Psychiatric:         Mood and Affect: Mood normal.         Behavior: Behavior normal.         Thought Content: Thought content normal.         Judgment: Judgment normal.       Tolerating statin well.  Assessment:       No diagnosis found.    Plan:       Pt to continue same tx as now and RTC in 6 weeks for follow up, no labs prior.

## 2021-01-19 ENCOUNTER — OFFICE VISIT (OUTPATIENT)
Dept: FAMILY MEDICINE | Facility: CLINIC | Age: 76
End: 2021-01-19
Payer: MEDICARE

## 2021-01-19 VITALS
HEIGHT: 69 IN | SYSTOLIC BLOOD PRESSURE: 138 MMHG | HEART RATE: 95 BPM | BODY MASS INDEX: 35.55 KG/M2 | TEMPERATURE: 97 F | WEIGHT: 240 LBS | DIASTOLIC BLOOD PRESSURE: 76 MMHG

## 2021-01-19 DIAGNOSIS — R07.89 OTHER CHEST PAIN: Primary | ICD-10-CM

## 2021-01-19 DIAGNOSIS — I47.10 SVT (SUPRAVENTRICULAR TACHYCARDIA): ICD-10-CM

## 2021-01-19 DIAGNOSIS — E78.00 HYPERCHOLESTEROLEMIA: ICD-10-CM

## 2021-01-19 DIAGNOSIS — Z95.1 HX OF CABG: ICD-10-CM

## 2021-01-19 DIAGNOSIS — I48.0 PAF (PAROXYSMAL ATRIAL FIBRILLATION): ICD-10-CM

## 2021-01-19 DIAGNOSIS — N40.1 BENIGN PROSTATIC HYPERPLASIA WITH LOWER URINARY TRACT SYMPTOMS, SYMPTOM DETAILS UNSPECIFIED: ICD-10-CM

## 2021-01-19 DIAGNOSIS — I82.5Z2 CHRONIC DEEP VEIN THROMBOSIS (DVT) OF DISTAL VEIN OF LEFT LOWER EXTREMITY: ICD-10-CM

## 2021-01-19 DIAGNOSIS — I25.118 CORONARY ARTERY DISEASE INVOLVING NATIVE CORONARY ARTERY OF NATIVE HEART WITH OTHER FORM OF ANGINA PECTORIS: ICD-10-CM

## 2021-01-19 PROCEDURE — 99214 PR OFFICE/OUTPT VISIT, EST, LEVL IV, 30-39 MIN: ICD-10-PCS | Mod: S$GLB,,, | Performed by: INTERNAL MEDICINE

## 2021-01-19 PROCEDURE — 99214 OFFICE O/P EST MOD 30 MIN: CPT | Mod: S$GLB,,, | Performed by: INTERNAL MEDICINE

## 2021-01-19 RX ORDER — ASPIRIN 81 MG/1
81 TABLET ORAL DAILY
COMMUNITY
End: 2023-02-09 | Stop reason: SDUPTHER

## 2021-01-19 RX ORDER — FOLIC ACID 1 MG/1
1 TABLET ORAL
COMMUNITY
End: 2021-04-21 | Stop reason: SDUPTHER

## 2021-01-19 RX ORDER — TAMSULOSIN HYDROCHLORIDE 0.4 MG/1
0.4 CAPSULE ORAL DAILY
Qty: 90 CAPSULE | Refills: 3 | Status: SHIPPED | OUTPATIENT
Start: 2021-01-19 | End: 2021-04-21 | Stop reason: SDUPTHER

## 2021-01-19 RX ORDER — PRAVASTATIN SODIUM 80 MG/1
80 TABLET ORAL DAILY
Qty: 90 TABLET | Refills: 3 | Status: SHIPPED | OUTPATIENT
Start: 2021-01-19 | End: 2021-04-21 | Stop reason: SDUPTHER

## 2021-01-25 DIAGNOSIS — I25.10 CORONARY ATHEROSCLEROSIS OF NATIVE CORONARY ARTERY: ICD-10-CM

## 2021-01-25 DIAGNOSIS — Z95.1 POSTSURGICAL AORTOCORONARY BYPASS STATUS: ICD-10-CM

## 2021-01-25 DIAGNOSIS — R07.9 CHEST PAIN, UNSPECIFIED: Primary | ICD-10-CM

## 2021-02-04 ENCOUNTER — CLINICAL SUPPORT (OUTPATIENT)
Dept: CARDIOLOGY | Facility: HOSPITAL | Age: 76
End: 2021-02-04
Attending: SPECIALIST
Payer: MEDICARE

## 2021-02-04 ENCOUNTER — HOSPITAL ENCOUNTER (OUTPATIENT)
Dept: RADIOLOGY | Facility: HOSPITAL | Age: 76
Discharge: HOME | End: 2021-02-04
Attending: SPECIALIST
Payer: MEDICARE

## 2021-02-04 DIAGNOSIS — I25.10 CORONARY ATHEROSCLEROSIS OF NATIVE CORONARY ARTERY: ICD-10-CM

## 2021-02-04 DIAGNOSIS — Z95.1 POSTSURGICAL AORTOCORONARY BYPASS STATUS: ICD-10-CM

## 2021-02-04 DIAGNOSIS — R07.9 CHEST PAIN, UNSPECIFIED: ICD-10-CM

## 2021-02-04 LAB
CV PHARM DOSE: 0.4 MG
CV STRESS BASE HR: 61 BPM
OHS CV CPX 85 PERCENT MAX PREDICTED HEART RATE MALE: 122
OHS CV CPX MAX PREDICTED HEART RATE: 144
OHS CV CPX PATIENT IS FEMALE: 0
OHS CV CPX PATIENT IS MALE: 1
OHS CV CPX PEAK HEAR RATE: 78 BPM
OHS CV CPX PERCENT MAX PREDICTED HEART RATE ACHIEVED: 54

## 2021-02-04 PROCEDURE — A9502 TC99M TETROFOSMIN: HCPCS

## 2021-02-04 PROCEDURE — 78452 HT MUSCLE IMAGE SPECT MULT: CPT | Mod: TC

## 2021-02-04 PROCEDURE — 63600175 PHARM REV CODE 636 W HCPCS: Performed by: SPECIALIST

## 2021-02-04 PROCEDURE — 93017 CV STRESS TEST TRACING ONLY: CPT

## 2021-02-04 RX ORDER — REGADENOSON 0.08 MG/ML
0.4 INJECTION, SOLUTION INTRAVENOUS ONCE
Status: COMPLETED | OUTPATIENT
Start: 2021-02-04 | End: 2021-02-04

## 2021-02-04 RX ADMIN — REGADENOSON 0.4 MG: 0.08 INJECTION, SOLUTION INTRAVENOUS at 10:02

## 2021-02-18 ENCOUNTER — IMMUNIZATION (OUTPATIENT)
Dept: PRIMARY CARE CLINIC | Facility: CLINIC | Age: 76
End: 2021-02-18
Payer: MEDICARE

## 2021-02-18 DIAGNOSIS — Z23 NEED FOR VACCINATION: Primary | ICD-10-CM

## 2021-02-18 PROCEDURE — 0001A COVID-19, MRNA, LNP-S, PF, 30 MCG/0.3 ML DOSE VACCINE: CPT | Mod: S$GLB,,, | Performed by: FAMILY MEDICINE

## 2021-02-18 PROCEDURE — 91300 COVID-19, MRNA, LNP-S, PF, 30 MCG/0.3 ML DOSE VACCINE: CPT | Mod: S$GLB,,, | Performed by: FAMILY MEDICINE

## 2021-02-18 PROCEDURE — 0001A COVID-19, MRNA, LNP-S, PF, 30 MCG/0.3 ML DOSE VACCINE: ICD-10-PCS | Mod: S$GLB,,, | Performed by: FAMILY MEDICINE

## 2021-02-18 PROCEDURE — 91300 COVID-19, MRNA, LNP-S, PF, 30 MCG/0.3 ML DOSE VACCINE: ICD-10-PCS | Mod: S$GLB,,, | Performed by: FAMILY MEDICINE

## 2021-03-01 ENCOUNTER — OFFICE VISIT (OUTPATIENT)
Dept: FAMILY MEDICINE | Facility: CLINIC | Age: 76
End: 2021-03-01
Payer: MEDICARE

## 2021-03-01 VITALS
BODY MASS INDEX: 35.25 KG/M2 | OXYGEN SATURATION: 95 % | SYSTOLIC BLOOD PRESSURE: 118 MMHG | HEIGHT: 69 IN | HEART RATE: 55 BPM | RESPIRATION RATE: 16 BRPM | WEIGHT: 238 LBS | TEMPERATURE: 99 F | DIASTOLIC BLOOD PRESSURE: 64 MMHG

## 2021-03-01 DIAGNOSIS — R42 DIZZINESS: ICD-10-CM

## 2021-03-01 DIAGNOSIS — I25.10 CORONARY ARTERY DISEASE INVOLVING NATIVE CORONARY ARTERY OF NATIVE HEART WITHOUT ANGINA PECTORIS: ICD-10-CM

## 2021-03-01 DIAGNOSIS — R36.1 HEMATOSPERMIA: ICD-10-CM

## 2021-03-01 DIAGNOSIS — Z92.29 HX: LONG TERM ANTICOAGULANT USE: ICD-10-CM

## 2021-03-01 DIAGNOSIS — R55 NEAR SYNCOPE: ICD-10-CM

## 2021-03-01 DIAGNOSIS — Z98.61 HISTORY OF PTCA: ICD-10-CM

## 2021-03-01 DIAGNOSIS — I95.2 HYPOTENSION DUE TO DRUGS: Primary | ICD-10-CM

## 2021-03-01 DIAGNOSIS — E78.5 HYPERLIPIDEMIA, UNSPECIFIED HYPERLIPIDEMIA TYPE: ICD-10-CM

## 2021-03-01 PROCEDURE — 99214 OFFICE O/P EST MOD 30 MIN: CPT | Mod: S$GLB,,, | Performed by: INTERNAL MEDICINE

## 2021-03-01 PROCEDURE — 99214 PR OFFICE/OUTPT VISIT, EST, LEVL IV, 30-39 MIN: ICD-10-PCS | Mod: S$GLB,,, | Performed by: INTERNAL MEDICINE

## 2021-03-09 ENCOUNTER — OFFICE VISIT (OUTPATIENT)
Dept: UROLOGY | Facility: CLINIC | Age: 76
End: 2021-03-09
Payer: MEDICARE

## 2021-03-09 ENCOUNTER — APPOINTMENT (OUTPATIENT)
Dept: LAB | Facility: HOSPITAL | Age: 76
End: 2021-03-09
Attending: INTERNAL MEDICINE
Payer: MEDICARE

## 2021-03-09 VITALS
SYSTOLIC BLOOD PRESSURE: 154 MMHG | HEIGHT: 69 IN | WEIGHT: 237.88 LBS | RESPIRATION RATE: 18 BRPM | HEART RATE: 51 BPM | DIASTOLIC BLOOD PRESSURE: 88 MMHG | BODY MASS INDEX: 35.23 KG/M2

## 2021-03-09 DIAGNOSIS — N40.1 BENIGN PROSTATIC HYPERPLASIA WITH LOWER URINARY TRACT SYMPTOMS, SYMPTOM DETAILS UNSPECIFIED: ICD-10-CM

## 2021-03-09 DIAGNOSIS — R31.29 MICROSCOPIC HEMATURIA: ICD-10-CM

## 2021-03-09 DIAGNOSIS — N50.819 PAIN IN TESTICLE, UNSPECIFIED LATERALITY: ICD-10-CM

## 2021-03-09 DIAGNOSIS — R36.1 HEMATOSPERMIA: Primary | ICD-10-CM

## 2021-03-09 LAB
BACTERIA #/AREA URNS HPF: NORMAL /HPF
BILIRUB SERPL-MCNC: ABNORMAL MG/DL
BLOOD URINE, POC: ABNORMAL
CLARITY, POC UA: CLEAR
COLOR, POC UA: YELLOW
GLUCOSE UR QL STRIP: ABNORMAL
KETONES UR QL STRIP: ABNORMAL
LEUKOCYTE ESTERASE URINE, POC: ABNORMAL
MICROSCOPIC COMMENT: NORMAL
NITRITE, POC UA: ABNORMAL
PH, POC UA: 6
PROTEIN, POC: ABNORMAL
RBC #/AREA URNS HPF: 1 /HPF (ref 0–4)
SPECIFIC GRAVITY, POC UA: 1.01
SQUAMOUS #/AREA URNS HPF: 1 /HPF
UROBILINOGEN, POC UA: 0.2
WBC #/AREA URNS HPF: 1 /HPF (ref 0–5)
WBC CLUMPS URNS QL MICRO: NORMAL

## 2021-03-09 PROCEDURE — 99999 PR PBB SHADOW E&M-EST. PATIENT-LVL IV: ICD-10-PCS | Mod: PBBFAC,,, | Performed by: NURSE PRACTITIONER

## 2021-03-09 PROCEDURE — 87086 URINE CULTURE/COLONY COUNT: CPT | Performed by: NURSE PRACTITIONER

## 2021-03-09 PROCEDURE — 99204 OFFICE O/P NEW MOD 45 MIN: CPT | Mod: S$PBB,,, | Performed by: NURSE PRACTITIONER

## 2021-03-09 PROCEDURE — 99204 PR OFFICE/OUTPT VISIT, NEW, LEVL IV, 45-59 MIN: ICD-10-PCS | Mod: S$PBB,,, | Performed by: NURSE PRACTITIONER

## 2021-03-09 PROCEDURE — 99999 PR PBB SHADOW E&M-EST. PATIENT-LVL IV: CPT | Mod: PBBFAC,,, | Performed by: NURSE PRACTITIONER

## 2021-03-09 PROCEDURE — 81002 URINALYSIS NONAUTO W/O SCOPE: CPT | Mod: PBBFAC,PN | Performed by: NURSE PRACTITIONER

## 2021-03-09 PROCEDURE — 81000 URINALYSIS NONAUTO W/SCOPE: CPT | Performed by: NURSE PRACTITIONER

## 2021-03-09 PROCEDURE — 99214 OFFICE O/P EST MOD 30 MIN: CPT | Mod: PBBFAC,PN | Performed by: NURSE PRACTITIONER

## 2021-03-09 RX ORDER — DOXYCYCLINE HYCLATE 100 MG
100 TABLET ORAL 2 TIMES DAILY
Qty: 42 TABLET | Refills: 0 | Status: SHIPPED | OUTPATIENT
Start: 2021-03-09 | End: 2021-03-30

## 2021-03-11 ENCOUNTER — IMMUNIZATION (OUTPATIENT)
Dept: PRIMARY CARE CLINIC | Facility: CLINIC | Age: 76
End: 2021-03-11

## 2021-03-11 DIAGNOSIS — Z23 NEED FOR VACCINATION: Primary | ICD-10-CM

## 2021-03-11 LAB — BACTERIA UR CULT: NO GROWTH

## 2021-03-11 PROCEDURE — 91300 COVID-19, MRNA, LNP-S, PF, 30 MCG/0.3 ML DOSE VACCINE: ICD-10-PCS | Mod: S$GLB,,, | Performed by: FAMILY MEDICINE

## 2021-03-11 PROCEDURE — 0002A COVID-19, MRNA, LNP-S, PF, 30 MCG/0.3 ML DOSE VACCINE: CPT | Mod: CV19,S$GLB,, | Performed by: FAMILY MEDICINE

## 2021-03-11 PROCEDURE — 0002A COVID-19, MRNA, LNP-S, PF, 30 MCG/0.3 ML DOSE VACCINE: ICD-10-PCS | Mod: CV19,S$GLB,, | Performed by: FAMILY MEDICINE

## 2021-03-11 PROCEDURE — 91300 COVID-19, MRNA, LNP-S, PF, 30 MCG/0.3 ML DOSE VACCINE: CPT | Mod: S$GLB,,, | Performed by: FAMILY MEDICINE

## 2021-03-22 ENCOUNTER — OFFICE VISIT (OUTPATIENT)
Dept: FAMILY MEDICINE | Facility: CLINIC | Age: 76
End: 2021-03-22
Payer: MEDICARE

## 2021-03-22 VITALS
TEMPERATURE: 98 F | OXYGEN SATURATION: 96 % | HEIGHT: 69 IN | BODY MASS INDEX: 35.25 KG/M2 | WEIGHT: 238 LBS | DIASTOLIC BLOOD PRESSURE: 80 MMHG | SYSTOLIC BLOOD PRESSURE: 138 MMHG | HEART RATE: 51 BPM | RESPIRATION RATE: 16 BRPM

## 2021-03-22 DIAGNOSIS — I25.10 CORONARY ARTERY DISEASE INVOLVING NATIVE CORONARY ARTERY OF NATIVE HEART WITHOUT ANGINA PECTORIS: ICD-10-CM

## 2021-03-22 DIAGNOSIS — I95.2 HYPOTENSION DUE TO DRUGS: Primary | ICD-10-CM

## 2021-03-22 DIAGNOSIS — Z98.61 HISTORY OF PTCA: ICD-10-CM

## 2021-03-22 DIAGNOSIS — R55 NEAR SYNCOPE: ICD-10-CM

## 2021-03-22 DIAGNOSIS — I49.5 BRADY-TACHY SYNDROME: ICD-10-CM

## 2021-03-22 DIAGNOSIS — I47.10 SVT (SUPRAVENTRICULAR TACHYCARDIA): ICD-10-CM

## 2021-03-22 PROCEDURE — 99213 OFFICE O/P EST LOW 20 MIN: CPT | Mod: S$GLB,,, | Performed by: INTERNAL MEDICINE

## 2021-03-22 PROCEDURE — 99213 PR OFFICE/OUTPT VISIT, EST, LEVL III, 20-29 MIN: ICD-10-PCS | Mod: S$GLB,,, | Performed by: INTERNAL MEDICINE

## 2021-04-08 ENCOUNTER — OFFICE VISIT (OUTPATIENT)
Dept: FAMILY MEDICINE | Facility: CLINIC | Age: 76
End: 2021-04-08
Payer: MEDICARE

## 2021-04-08 VITALS
HEIGHT: 69 IN | BODY MASS INDEX: 35.25 KG/M2 | DIASTOLIC BLOOD PRESSURE: 62 MMHG | TEMPERATURE: 98 F | RESPIRATION RATE: 16 BRPM | SYSTOLIC BLOOD PRESSURE: 102 MMHG | OXYGEN SATURATION: 96 % | WEIGHT: 238 LBS | HEART RATE: 53 BPM

## 2021-04-08 DIAGNOSIS — I48.0 PAF (PAROXYSMAL ATRIAL FIBRILLATION): ICD-10-CM

## 2021-04-08 DIAGNOSIS — Z92.29 HX: LONG TERM ANTICOAGULANT USE: ICD-10-CM

## 2021-04-08 DIAGNOSIS — Z95.1 HX OF CABG: ICD-10-CM

## 2021-04-08 DIAGNOSIS — I95.2 HYPOTENSION DUE TO DRUGS: ICD-10-CM

## 2021-04-08 DIAGNOSIS — R55 NEAR SYNCOPE: Primary | ICD-10-CM

## 2021-04-08 DIAGNOSIS — Z98.61 HISTORY OF PTCA: ICD-10-CM

## 2021-04-08 PROCEDURE — 99213 OFFICE O/P EST LOW 20 MIN: CPT | Mod: S$GLB,,, | Performed by: INTERNAL MEDICINE

## 2021-04-08 PROCEDURE — 99213 PR OFFICE/OUTPT VISIT, EST, LEVL III, 20-29 MIN: ICD-10-PCS | Mod: S$GLB,,, | Performed by: INTERNAL MEDICINE

## 2021-04-16 ENCOUNTER — OFFICE VISIT (OUTPATIENT)
Dept: UROLOGY | Facility: CLINIC | Age: 76
End: 2021-04-16
Payer: MEDICARE

## 2021-04-16 VITALS
SYSTOLIC BLOOD PRESSURE: 118 MMHG | HEIGHT: 69 IN | DIASTOLIC BLOOD PRESSURE: 78 MMHG | HEART RATE: 52 BPM | WEIGHT: 238.13 LBS | BODY MASS INDEX: 35.27 KG/M2

## 2021-04-16 DIAGNOSIS — R36.1 HEMATOSPERMIA: Primary | ICD-10-CM

## 2021-04-16 PROCEDURE — 99213 OFFICE O/P EST LOW 20 MIN: CPT | Mod: S$PBB,,, | Performed by: NURSE PRACTITIONER

## 2021-04-16 PROCEDURE — 99999 PR PBB SHADOW E&M-EST. PATIENT-LVL III: CPT | Mod: PBBFAC,,, | Performed by: NURSE PRACTITIONER

## 2021-04-16 PROCEDURE — 99213 PR OFFICE/OUTPT VISIT, EST, LEVL III, 20-29 MIN: ICD-10-PCS | Mod: S$PBB,,, | Performed by: NURSE PRACTITIONER

## 2021-04-16 PROCEDURE — 99213 OFFICE O/P EST LOW 20 MIN: CPT | Mod: PBBFAC,PN | Performed by: NURSE PRACTITIONER

## 2021-04-16 PROCEDURE — 99999 PR PBB SHADOW E&M-EST. PATIENT-LVL III: ICD-10-PCS | Mod: PBBFAC,,, | Performed by: NURSE PRACTITIONER

## 2021-04-20 ENCOUNTER — APPOINTMENT (OUTPATIENT)
Dept: LAB | Facility: HOSPITAL | Age: 76
End: 2021-04-20
Attending: INTERNAL MEDICINE
Payer: MEDICARE

## 2021-04-20 ENCOUNTER — LAB VISIT (OUTPATIENT)
Dept: FAMILY MEDICINE | Facility: CLINIC | Age: 76
End: 2021-04-20
Payer: MEDICARE

## 2021-04-20 DIAGNOSIS — E78.00 PURE HYPERCHOLESTEROLEMIA: ICD-10-CM

## 2021-04-20 DIAGNOSIS — Z79.899 ENCOUNTER FOR LONG-TERM (CURRENT) USE OF OTHER MEDICATIONS: ICD-10-CM

## 2021-04-20 DIAGNOSIS — Z12.5 SPECIAL SCREENING FOR MALIGNANT NEOPLASM OF PROSTATE: ICD-10-CM

## 2021-04-20 DIAGNOSIS — Z00.00 ROUTINE GENERAL MEDICAL EXAMINATION AT A HEALTH CARE FACILITY: Primary | ICD-10-CM

## 2021-04-20 DIAGNOSIS — D64.9 ANEMIA, UNSPECIFIED: ICD-10-CM

## 2021-04-20 LAB
ALBUMIN SERPL BCP-MCNC: 3.5 G/DL (ref 3.5–5.2)
ALP SERPL-CCNC: 62 U/L (ref 55–135)
ALT SERPL W/O P-5'-P-CCNC: 9 U/L (ref 10–44)
ANION GAP SERPL CALC-SCNC: 4 MMOL/L (ref 8–16)
AST SERPL-CCNC: 15 U/L (ref 10–40)
BASOPHILS # BLD AUTO: 0.06 K/UL (ref 0–0.2)
BASOPHILS NFR BLD: 0.6 % (ref 0–1.9)
BILIRUB SERPL-MCNC: 0.5 MG/DL (ref 0.1–1)
BUN SERPL-MCNC: 17 MG/DL (ref 8–23)
CALCIUM SERPL-MCNC: 9 MG/DL (ref 8.7–10.5)
CHLORIDE SERPL-SCNC: 106 MMOL/L (ref 95–110)
CHOLEST SERPL-MCNC: 149 MG/DL (ref 120–199)
CHOLEST/HDLC SERPL: 3.9 {RATIO} (ref 2–5)
CO2 SERPL-SCNC: 31 MMOL/L (ref 23–29)
COMPLEXED PSA SERPL-MCNC: 1.3 NG/ML (ref 0–4)
CREAT SERPL-MCNC: 1.2 MG/DL (ref 0.5–1.4)
DIFFERENTIAL METHOD: ABNORMAL
EOSINOPHIL # BLD AUTO: 0.3 K/UL (ref 0–0.5)
EOSINOPHIL NFR BLD: 2.8 % (ref 0–8)
ERYTHROCYTE [DISTWIDTH] IN BLOOD BY AUTOMATED COUNT: 13.2 % (ref 11.5–14.5)
EST. GFR  (AFRICAN AMERICAN): >60 ML/MIN/1.73 M^2
EST. GFR  (NON AFRICAN AMERICAN): 58 ML/MIN/1.73 M^2
GLUCOSE SERPL-MCNC: 98 MG/DL (ref 70–110)
HCT VFR BLD AUTO: 42.7 % (ref 40–54)
HDLC SERPL-MCNC: 38 MG/DL (ref 40–75)
HDLC SERPL: 25.5 % (ref 20–50)
HGB BLD-MCNC: 13.8 G/DL (ref 14–18)
IMM GRANULOCYTES # BLD AUTO: 0.04 K/UL (ref 0–0.04)
IMM GRANULOCYTES NFR BLD AUTO: 0.4 % (ref 0–0.5)
LDLC SERPL CALC-MCNC: 87.2 MG/DL (ref 63–159)
LYMPHOCYTES # BLD AUTO: 5.3 K/UL (ref 1–4.8)
LYMPHOCYTES NFR BLD: 53 % (ref 18–48)
MCH RBC QN AUTO: 30.1 PG (ref 27–31)
MCHC RBC AUTO-ENTMCNC: 32.3 G/DL (ref 32–36)
MCV RBC AUTO: 93 FL (ref 82–98)
MONOCYTES # BLD AUTO: 0.6 K/UL (ref 0.3–1)
MONOCYTES NFR BLD: 6 % (ref 4–15)
NEUTROPHILS # BLD AUTO: 3.7 K/UL (ref 1.8–7.7)
NEUTROPHILS NFR BLD: 37.2 % (ref 38–73)
NONHDLC SERPL-MCNC: 111 MG/DL
NRBC BLD-RTO: 0 /100 WBC
PLATELET # BLD AUTO: 193 K/UL (ref 150–450)
PMV BLD AUTO: 11.5 FL (ref 9.2–12.9)
POTASSIUM SERPL-SCNC: 4.3 MMOL/L (ref 3.5–5.1)
PROT SERPL-MCNC: 6.3 G/DL (ref 6–8.4)
RBC # BLD AUTO: 4.58 M/UL (ref 4.6–6.2)
SODIUM SERPL-SCNC: 141 MMOL/L (ref 136–145)
TRIGL SERPL-MCNC: 119 MG/DL (ref 30–150)
TSH SERPL DL<=0.005 MIU/L-ACNC: 1.26 UIU/ML (ref 0.4–4)
WBC # BLD AUTO: 9.94 K/UL (ref 3.9–12.7)

## 2021-04-20 PROCEDURE — 84443 ASSAY THYROID STIM HORMONE: CPT | Performed by: INTERNAL MEDICINE

## 2021-04-20 PROCEDURE — 84153 ASSAY OF PSA TOTAL: CPT | Performed by: INTERNAL MEDICINE

## 2021-04-20 PROCEDURE — 85025 COMPLETE CBC W/AUTO DIFF WBC: CPT | Performed by: INTERNAL MEDICINE

## 2021-04-20 PROCEDURE — 80061 LIPID PANEL: CPT | Performed by: INTERNAL MEDICINE

## 2021-04-20 PROCEDURE — 83036 HEMOGLOBIN GLYCOSYLATED A1C: CPT | Performed by: INTERNAL MEDICINE

## 2021-04-20 PROCEDURE — 80053 COMPREHEN METABOLIC PANEL: CPT | Performed by: INTERNAL MEDICINE

## 2021-04-21 ENCOUNTER — OFFICE VISIT (OUTPATIENT)
Dept: FAMILY MEDICINE | Facility: CLINIC | Age: 76
End: 2021-04-21
Payer: MEDICARE

## 2021-04-21 VITALS
RESPIRATION RATE: 16 BRPM | OXYGEN SATURATION: 98 % | WEIGHT: 238 LBS | SYSTOLIC BLOOD PRESSURE: 130 MMHG | TEMPERATURE: 98 F | BODY MASS INDEX: 35.25 KG/M2 | DIASTOLIC BLOOD PRESSURE: 66 MMHG | HEIGHT: 69 IN | HEART RATE: 50 BPM

## 2021-04-21 DIAGNOSIS — I82.412 DVT OF DEEP FEMORAL VEIN, LEFT: ICD-10-CM

## 2021-04-21 DIAGNOSIS — Z15.89 MTHFR MUTATION: ICD-10-CM

## 2021-04-21 DIAGNOSIS — Z98.61 HISTORY OF PTCA: ICD-10-CM

## 2021-04-21 DIAGNOSIS — H53.132 SUDDEN LOSS OF VISION, LEFT: ICD-10-CM

## 2021-04-21 DIAGNOSIS — Z95.1 HX OF CABG: Primary | ICD-10-CM

## 2021-04-21 DIAGNOSIS — I48.0 PAF (PAROXYSMAL ATRIAL FIBRILLATION): ICD-10-CM

## 2021-04-21 DIAGNOSIS — N40.1 BENIGN PROSTATIC HYPERPLASIA WITH LOWER URINARY TRACT SYMPTOMS, SYMPTOM DETAILS UNSPECIFIED: ICD-10-CM

## 2021-04-21 DIAGNOSIS — Z95.818 STATUS POST PLACEMENT OF IMPLANTABLE LOOP RECORDER: ICD-10-CM

## 2021-04-21 DIAGNOSIS — G45.9 TIA (TRANSIENT ISCHEMIC ATTACK): ICD-10-CM

## 2021-04-21 DIAGNOSIS — I10 HTN, GOAL BELOW 130/80: ICD-10-CM

## 2021-04-21 DIAGNOSIS — E78.5 HYPERLIPIDEMIA, UNSPECIFIED HYPERLIPIDEMIA TYPE: ICD-10-CM

## 2021-04-21 DIAGNOSIS — Z92.29 HX: LONG TERM ANTICOAGULANT USE: ICD-10-CM

## 2021-04-21 DIAGNOSIS — I49.5 BRADY-TACHY SYNDROME: ICD-10-CM

## 2021-04-21 DIAGNOSIS — E78.00 HYPERCHOLESTEROLEMIA: ICD-10-CM

## 2021-04-21 LAB
ESTIMATED AVG GLUCOSE: 120 MG/DL (ref 68–131)
HBA1C MFR BLD: 5.8 % (ref 4–5.6)

## 2021-04-21 PROCEDURE — 99214 OFFICE O/P EST MOD 30 MIN: CPT | Mod: S$GLB,,, | Performed by: INTERNAL MEDICINE

## 2021-04-21 PROCEDURE — 99214 PR OFFICE/OUTPT VISIT, EST, LEVL IV, 30-39 MIN: ICD-10-PCS | Mod: S$GLB,,, | Performed by: INTERNAL MEDICINE

## 2021-04-21 RX ORDER — HYDROCHLOROTHIAZIDE 12.5 MG/1
12.5 CAPSULE ORAL DAILY
Qty: 90 CAPSULE | Refills: 3 | Status: SHIPPED | OUTPATIENT
Start: 2021-04-21 | End: 2022-05-08

## 2021-04-21 RX ORDER — TAMSULOSIN HYDROCHLORIDE 0.4 MG/1
0.4 CAPSULE ORAL DAILY
Qty: 90 CAPSULE | Refills: 3 | Status: SHIPPED | OUTPATIENT
Start: 2021-04-21 | End: 2022-05-30

## 2021-04-21 RX ORDER — FOLIC ACID 1 MG/1
1 TABLET ORAL DAILY
Qty: 90 TABLET | Refills: 3 | Status: SHIPPED | OUTPATIENT
Start: 2021-04-21 | End: 2022-05-30

## 2021-04-21 RX ORDER — IRBESARTAN 300 MG/1
300 TABLET ORAL NIGHTLY
Qty: 90 TABLET | Refills: 3 | Status: SHIPPED | OUTPATIENT
Start: 2021-04-21 | End: 2022-07-29

## 2021-04-21 RX ORDER — PRAVASTATIN SODIUM 80 MG/1
80 TABLET ORAL DAILY
Qty: 90 TABLET | Refills: 3 | Status: SHIPPED | OUTPATIENT
Start: 2021-04-21 | End: 2023-07-10

## 2021-04-21 RX ORDER — SOTALOL HYDROCHLORIDE 120 MG/1
120 TABLET ORAL 2 TIMES DAILY
Qty: 180 TABLET | Refills: 3 | Status: SHIPPED | OUTPATIENT
Start: 2021-04-21 | End: 2022-03-10

## 2021-10-12 ENCOUNTER — CLINICAL SUPPORT (OUTPATIENT)
Dept: FAMILY MEDICINE | Facility: CLINIC | Age: 76
End: 2021-10-12
Payer: MEDICARE

## 2021-10-12 PROCEDURE — 90694 FLU VACCINE - QUADRIVALENT - ADJUVANTED: ICD-10-PCS | Mod: S$GLB,,, | Performed by: INTERNAL MEDICINE

## 2021-10-12 PROCEDURE — 90694 VACC AIIV4 NO PRSRV 0.5ML IM: CPT | Mod: S$GLB,,, | Performed by: INTERNAL MEDICINE

## 2021-10-12 PROCEDURE — G0008 ADMIN INFLUENZA VIRUS VAC: HCPCS | Mod: S$GLB,,, | Performed by: INTERNAL MEDICINE

## 2021-10-12 PROCEDURE — G0008 FLU VACCINE - QUADRIVALENT - ADJUVANTED: ICD-10-PCS | Mod: S$GLB,,, | Performed by: INTERNAL MEDICINE

## 2022-02-25 ENCOUNTER — CLINICAL SUPPORT (OUTPATIENT)
Dept: FAMILY MEDICINE | Facility: CLINIC | Age: 77
End: 2022-02-25
Payer: MEDICARE

## 2022-02-25 DIAGNOSIS — Z11.52 ENCOUNTER FOR SCREENING FOR COVID-19: ICD-10-CM

## 2022-02-25 LAB
CTP QC/QA: YES
SARS-COV-2 RDRP RESP QL NAA+PROBE: NEGATIVE

## 2022-02-25 PROCEDURE — U0002 COVID-19 LAB TEST NON-CDC: HCPCS | Mod: PBBFAC,PN

## 2022-04-20 ENCOUNTER — TELEPHONE (OUTPATIENT)
Dept: FAMILY MEDICINE | Facility: CLINIC | Age: 77
End: 2022-04-20
Payer: MEDICARE

## 2022-04-21 ENCOUNTER — CLINICAL SUPPORT (OUTPATIENT)
Dept: FAMILY MEDICINE | Facility: CLINIC | Age: 77
End: 2022-04-21
Payer: MEDICARE

## 2022-04-21 DIAGNOSIS — Z11.52 ENCOUNTER FOR SCREENING FOR SEVERE ACUTE RESPIRATORY SYNDROME CORONAVIRUS 2 (SARS-COV-2) INFECTION: Primary | ICD-10-CM

## 2022-04-21 LAB
CTP QC/QA: YES
SARS-COV-2 RDRP RESP QL NAA+PROBE: NEGATIVE

## 2022-04-21 PROCEDURE — U0002 COVID-19 LAB TEST NON-CDC: HCPCS | Mod: PBBFAC,PN

## 2022-05-16 ENCOUNTER — OFFICE VISIT (OUTPATIENT)
Dept: FAMILY MEDICINE | Facility: CLINIC | Age: 77
End: 2022-05-16
Payer: MEDICARE

## 2022-05-16 VITALS
DIASTOLIC BLOOD PRESSURE: 82 MMHG | OXYGEN SATURATION: 94 % | TEMPERATURE: 98 F | SYSTOLIC BLOOD PRESSURE: 118 MMHG | HEIGHT: 69 IN | WEIGHT: 229 LBS | BODY MASS INDEX: 33.92 KG/M2 | HEART RATE: 52 BPM

## 2022-05-16 DIAGNOSIS — Z79.899 ENCOUNTER FOR LONG-TERM (CURRENT) USE OF OTHER MEDICATIONS: ICD-10-CM

## 2022-05-16 DIAGNOSIS — E78.00 HYPERCHOLESTEROLEMIA: ICD-10-CM

## 2022-05-16 DIAGNOSIS — I82.412 DVT OF DEEP FEMORAL VEIN, LEFT: ICD-10-CM

## 2022-05-16 DIAGNOSIS — E03.9 HYPOTHYROIDISM, UNSPECIFIED TYPE: ICD-10-CM

## 2022-05-16 DIAGNOSIS — D64.9 ANEMIA, UNSPECIFIED TYPE: ICD-10-CM

## 2022-05-16 DIAGNOSIS — E72.12 METHYLENETETRAHYDROFOLATE REDUCTASE DEFICIENCY: ICD-10-CM

## 2022-05-16 DIAGNOSIS — Z11.59 NEED FOR HEPATITIS C SCREENING TEST: ICD-10-CM

## 2022-05-16 DIAGNOSIS — I25.118 CORONARY ARTERY DISEASE INVOLVING NATIVE CORONARY ARTERY OF NATIVE HEART WITH OTHER FORM OF ANGINA PECTORIS: Primary | ICD-10-CM

## 2022-05-16 DIAGNOSIS — R73.02 IGT (IMPAIRED GLUCOSE TOLERANCE): ICD-10-CM

## 2022-05-16 DIAGNOSIS — I49.5 BRADY-TACHY SYNDROME: ICD-10-CM

## 2022-05-16 PROCEDURE — 99213 OFFICE O/P EST LOW 20 MIN: CPT | Mod: S$GLB,,, | Performed by: INTERNAL MEDICINE

## 2022-05-16 PROCEDURE — 99213 PR OFFICE/OUTPT VISIT, EST, LEVL III, 20-29 MIN: ICD-10-PCS | Mod: S$GLB,,, | Performed by: INTERNAL MEDICINE

## 2022-05-16 NOTE — PROGRESS NOTES
Subjective:       Patient ID: Jeremias Hall is a 77 y.o. male.    Chief Complaint: Follow-up (Patient is here for a routine follow up visit. States he has swelling in both ankles. Started around 1 month ago. Not painful; no bruising or redness detected. ) and Care Gaps (Patient had Lipid Panel done 1 year ago and is due for pneumonia vaccine, tdap, part 2 of shingles and HEP-C Screening. 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  He has had a right saphenectomy for the bypass surgery which also discussing some edema of the lower extremity.  He had been a whole lot less active lately which also contributed to the swelling.  There are no signs of congestive failure decompensation.    His past due his wellness examination since April 20th of 2022 so arrangements have been made for him to have a CBC, comprehensive metabolic panel, hepatitis-C screening, lipids, TSH and hemoglobin A1c here tomorrow with the wellness examination to be completed by the nurse practitioner in the next 2-3 weeks.    Patient is up-to-date with his Tdap and pneumococcal vaccines and is at a Zostavax before so orders for the Shingrix vaccine have been provided today.    He has had 2 doses of the COVID vaccine plus the booster.        Review of Systems   All other systems reviewed and are negative.        Objective:      Physical Exam  Vitals and nursing note reviewed.   Constitutional:       General: He is not in acute distress.     Appearance: Normal appearance. He is obese. He is not toxic-appearing.   HENT:      Head: Normocephalic and atraumatic.   Cardiovascular:      Rate and Rhythm: Normal rate.   Musculoskeletal:         General: Swelling present.      Right lower leg: Edema present.      Left lower leg: Edema present.      Comments: Test 2+ pretibial edema to above the ankles bilaterally.  There is also  significant loss of hair.  No open wounds are identified.   Skin:     General: Skin is warm and dry.      Capillary Refill: Capillary refill takes 2 to 3 seconds.      Coloration: Skin is not jaundiced or pale.   Neurological:      General: No focal deficit present.      Mental Status: He is alert and oriented to person, place, and time. Mental status is at baseline.      Cranial Nerves: No cranial nerve deficit.      Sensory: No sensory deficit.      Motor: No weakness.      Coordination: Coordination normal.      Gait: Gait normal.   Psychiatric:         Mood and Affect: Mood normal.         Behavior: Behavior normal.         Thought Content: Thought content normal.         Judgment: Judgment normal.         Assessment:       Problem List Items Addressed This Visit        Cardiac/Vascular    Anthony-tachy syndrome    Coronary artery disease involving native coronary artery of native heart with other form of angina pectoris - Primary       Hematology    DVT of deep femoral vein, left       Endocrine    Methylenetetrahydrofolate reductase deficiency      Other Visit Diagnoses     Hypothyroidism, unspecified type        Anemia, unspecified type        Encounter for long-term (current) use of other medications        Hypercholesterolemia        IGT (impaired glucose tolerance)        Need for hepatitis C screening test              Plan:       Patient will be referred to vascular surgery for further evaluation and therapy of this 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  They will call him with regards to final scheduling details.    Laboratory evaluation fasting has been scheduled for tomorrow so that he can have his wellness examination for this year which is past due.    Medication list has been reviewed and there is no need for refills at this time.    Patient remains fully anticoagulated with the Eliquis because of history of deep venous thrombosis and pulmonary embolism with no signs  of bleeding complications.     As part of the workup hemoglobin A1c will be drawn to rule out significant impaired glucose tolerance.    Hepatitis-C screening antibody will also be ordered.    Lipid panel will be included in the wellness exam.    Care gaps have been addressed and he has been provided with the order for the Shingrix vaccination.  Patient had a Zostavax before.

## 2022-05-17 ENCOUNTER — LAB VISIT (OUTPATIENT)
Dept: FAMILY MEDICINE | Facility: CLINIC | Age: 77
End: 2022-05-17
Payer: MEDICARE

## 2022-05-17 DIAGNOSIS — D64.9 ANEMIA, UNSPECIFIED TYPE: ICD-10-CM

## 2022-05-17 DIAGNOSIS — E03.9 HYPOTHYROIDISM, UNSPECIFIED TYPE: ICD-10-CM

## 2022-05-17 DIAGNOSIS — Z11.59 NEED FOR HEPATITIS C SCREENING TEST: ICD-10-CM

## 2022-05-17 DIAGNOSIS — Z79.899 ENCOUNTER FOR LONG-TERM (CURRENT) USE OF OTHER MEDICATIONS: ICD-10-CM

## 2022-05-17 DIAGNOSIS — R73.02 IGT (IMPAIRED GLUCOSE TOLERANCE): ICD-10-CM

## 2022-05-17 DIAGNOSIS — E78.00 HYPERCHOLESTEROLEMIA: ICD-10-CM

## 2022-05-17 LAB
ALBUMIN SERPL BCP-MCNC: 3.6 G/DL (ref 3.5–5.2)
ALP SERPL-CCNC: 55 U/L (ref 55–135)
ALT SERPL W/O P-5'-P-CCNC: 15 U/L (ref 10–44)
ANION GAP SERPL CALC-SCNC: 9 MMOL/L (ref 8–16)
AST SERPL-CCNC: 16 U/L (ref 10–40)
BASOPHILS # BLD AUTO: 0.06 K/UL (ref 0–0.2)
BASOPHILS NFR BLD: 0.7 % (ref 0–1.9)
BILIRUB SERPL-MCNC: 0.7 MG/DL (ref 0.1–1)
BUN SERPL-MCNC: 17 MG/DL (ref 8–23)
CALCIUM SERPL-MCNC: 9.1 MG/DL (ref 8.7–10.5)
CHLORIDE SERPL-SCNC: 105 MMOL/L (ref 95–110)
CHOLEST SERPL-MCNC: 152 MG/DL (ref 120–199)
CHOLEST/HDLC SERPL: 3.8 {RATIO} (ref 2–5)
CO2 SERPL-SCNC: 27 MMOL/L (ref 23–29)
CREAT SERPL-MCNC: 1.2 MG/DL (ref 0.5–1.4)
DIFFERENTIAL METHOD: ABNORMAL
EOSINOPHIL # BLD AUTO: 0.3 K/UL (ref 0–0.5)
EOSINOPHIL NFR BLD: 3 % (ref 0–8)
ERYTHROCYTE [DISTWIDTH] IN BLOOD BY AUTOMATED COUNT: 13.5 % (ref 11.5–14.5)
EST. GFR  (AFRICAN AMERICAN): >60 ML/MIN/1.73 M^2
EST. GFR  (NON AFRICAN AMERICAN): 58 ML/MIN/1.73 M^2
GLUCOSE SERPL-MCNC: 92 MG/DL (ref 70–110)
HCT VFR BLD AUTO: 43.9 % (ref 40–54)
HDLC SERPL-MCNC: 40 MG/DL (ref 40–75)
HDLC SERPL: 26.3 % (ref 20–50)
HGB BLD-MCNC: 14.2 G/DL (ref 14–18)
IMM GRANULOCYTES # BLD AUTO: 0.04 K/UL (ref 0–0.04)
IMM GRANULOCYTES NFR BLD AUTO: 0.4 % (ref 0–0.5)
LDLC SERPL CALC-MCNC: 88.2 MG/DL (ref 63–159)
LYMPHOCYTES # BLD AUTO: 5.1 K/UL (ref 1–4.8)
LYMPHOCYTES NFR BLD: 55.8 % (ref 18–48)
MCH RBC QN AUTO: 30.1 PG (ref 27–31)
MCHC RBC AUTO-ENTMCNC: 32.3 G/DL (ref 32–36)
MCV RBC AUTO: 93 FL (ref 82–98)
MONOCYTES # BLD AUTO: 0.5 K/UL (ref 0.3–1)
MONOCYTES NFR BLD: 5.2 % (ref 4–15)
NEUTROPHILS # BLD AUTO: 3.2 K/UL (ref 1.8–7.7)
NEUTROPHILS NFR BLD: 34.9 % (ref 38–73)
NONHDLC SERPL-MCNC: 112 MG/DL
NRBC BLD-RTO: 0 /100 WBC
PLATELET # BLD AUTO: 218 K/UL (ref 150–450)
PMV BLD AUTO: 10.9 FL (ref 9.2–12.9)
POTASSIUM SERPL-SCNC: 4.1 MMOL/L (ref 3.5–5.1)
PROT SERPL-MCNC: 6.6 G/DL (ref 6–8.4)
RBC # BLD AUTO: 4.72 M/UL (ref 4.6–6.2)
SODIUM SERPL-SCNC: 141 MMOL/L (ref 136–145)
TRIGL SERPL-MCNC: 119 MG/DL (ref 30–150)
TSH SERPL DL<=0.005 MIU/L-ACNC: 1.2 UIU/ML (ref 0.4–4)
WBC # BLD AUTO: 9.05 K/UL (ref 3.9–12.7)

## 2022-05-17 PROCEDURE — 83036 HEMOGLOBIN GLYCOSYLATED A1C: CPT | Performed by: INTERNAL MEDICINE

## 2022-05-17 PROCEDURE — 86803 HEPATITIS C AB TEST: CPT | Performed by: INTERNAL MEDICINE

## 2022-05-17 PROCEDURE — 80053 COMPREHEN METABOLIC PANEL: CPT | Performed by: INTERNAL MEDICINE

## 2022-05-17 PROCEDURE — 80061 LIPID PANEL: CPT | Performed by: INTERNAL MEDICINE

## 2022-05-17 PROCEDURE — 85025 COMPLETE CBC W/AUTO DIFF WBC: CPT | Performed by: INTERNAL MEDICINE

## 2022-05-17 PROCEDURE — 84443 ASSAY THYROID STIM HORMONE: CPT | Performed by: INTERNAL MEDICINE

## 2022-05-18 LAB
ESTIMATED AVG GLUCOSE: 120 MG/DL (ref 68–131)
HBA1C MFR BLD: 5.8 % (ref 4–5.6)

## 2022-05-22 LAB — HCV AB SERPL QL IA: NEGATIVE

## 2022-05-30 ENCOUNTER — OFFICE VISIT (OUTPATIENT)
Dept: FAMILY MEDICINE | Facility: CLINIC | Age: 77
End: 2022-05-30
Payer: MEDICARE

## 2022-05-30 VITALS
SYSTOLIC BLOOD PRESSURE: 122 MMHG | DIASTOLIC BLOOD PRESSURE: 80 MMHG | HEART RATE: 53 BPM | TEMPERATURE: 98 F | HEIGHT: 69 IN | BODY MASS INDEX: 35.27 KG/M2 | OXYGEN SATURATION: 92 % | WEIGHT: 238.13 LBS | RESPIRATION RATE: 16 BRPM

## 2022-05-30 DIAGNOSIS — I10 ESSENTIAL HYPERTENSION: ICD-10-CM

## 2022-05-30 DIAGNOSIS — Z79.01 CURRENT USE OF LONG TERM ANTICOAGULATION: ICD-10-CM

## 2022-05-30 DIAGNOSIS — Z86.73 HISTORY OF TIA (TRANSIENT ISCHEMIC ATTACK): ICD-10-CM

## 2022-05-30 DIAGNOSIS — R73.02 IGT (IMPAIRED GLUCOSE TOLERANCE): ICD-10-CM

## 2022-05-30 DIAGNOSIS — Z95.1 HX OF CABG: ICD-10-CM

## 2022-05-30 DIAGNOSIS — Z74.09 OTHER REDUCED MOBILITY: ICD-10-CM

## 2022-05-30 DIAGNOSIS — Z95.818 STATUS POST PLACEMENT OF IMPLANTABLE LOOP RECORDER: ICD-10-CM

## 2022-05-30 DIAGNOSIS — I49.5 BRADY-TACHY SYNDROME: ICD-10-CM

## 2022-05-30 DIAGNOSIS — E66.9 OBESITY, CLASS II, BMI 35-39.9: ICD-10-CM

## 2022-05-30 DIAGNOSIS — Z86.718 HISTORY OF DVT (DEEP VEIN THROMBOSIS): ICD-10-CM

## 2022-05-30 DIAGNOSIS — I25.118 CORONARY ARTERY DISEASE INVOLVING NATIVE CORONARY ARTERY OF NATIVE HEART WITH OTHER FORM OF ANGINA PECTORIS: ICD-10-CM

## 2022-05-30 DIAGNOSIS — N40.1 BENIGN PROSTATIC HYPERPLASIA WITH LOWER URINARY TRACT SYMPTOMS, SYMPTOM DETAILS UNSPECIFIED: ICD-10-CM

## 2022-05-30 DIAGNOSIS — Z00.00 ENCOUNTER FOR PREVENTIVE HEALTH EXAMINATION: Primary | ICD-10-CM

## 2022-05-30 DIAGNOSIS — E72.12 METHYLENETETRAHYDROFOLATE REDUCTASE DEFICIENCY: ICD-10-CM

## 2022-05-30 PROBLEM — I82.409 DVT (DEEP VENOUS THROMBOSIS): Status: ACTIVE | Noted: 2022-05-30

## 2022-05-30 PROCEDURE — G0439 PR MEDICARE ANNUAL WELLNESS SUBSEQUENT VISIT: ICD-10-PCS | Mod: S$GLB,,, | Performed by: FAMILY MEDICINE

## 2022-05-30 PROCEDURE — G0439 PPPS, SUBSEQ VISIT: HCPCS | Mod: S$GLB,,, | Performed by: FAMILY MEDICINE

## 2022-05-30 NOTE — PATIENT INSTRUCTIONS
Counseling and Referral of Other Preventative  (Italic type indicates deductible and co-insurance are waived)    Patient Name: Jeremias Hall  Today's Date: 5/30/2022    Health Maintenance       Date Due Completion Date    Shingles Vaccine (1 of 2) Never done ---    COVID-19 Vaccine (3 - Booster for Pfizer series) 08/11/2021 3/11/2021    TETANUS VACCINE 10/09/2023 10/9/2013    Lipid Panel 05/17/2027 5/17/2022        No orders of the defined types were placed in this encounter.    The following information is provided to all patients.  This information is to help you find resources for any of the problems found today that may be affecting your health:                Living healthy guide: www.[facility].[place].gov      Understanding Diabetes: www.diabetes.org      Eating healthy: www.cdc.gov/healthyweight      [facility] home safety checklist: www.cdc.gov/steadi/patient.html      Agency on Aging: www.goea.[place].[facility]      Alcoholics anonymous (AA): www.aa.org      Physical Activity: www.radha.nih.gov/hq0birg      Tobacco use: www.quitwithusla.org

## 2022-05-30 NOTE — PROGRESS NOTES
"  Jeremias Hall presented for a  Medicare AWV and comprehensive Health Risk Assessment today. The following components were reviewed and updated:    · Medical history  · Family History  · Social history  · Allergies and Current Medications  · Health Risk Assessment  · Health Maintenance  · Care Team         ** See Completed Assessments for Annual Wellness Visit within the encounter summary.**         The following assessments were completed:  · Living Situation  · CAGE  · Depression Screening  · Timed Get Up and Go  · Whisper Test  · Cognitive Function Screening  · Nutrition Screening  · ADL Screening  · PAQ Screening            Vitals:    05/30/22 1057   BP: 122/80   BP Location: Right arm   Patient Position: Sitting   BP Method: Medium (Manual)   Pulse: (!) 53   Resp: 16   Temp: 97.6 °F (36.4 °C)   TempSrc: Oral   SpO2: (!) 92%   Weight: 108 kg (238 lb 1.6 oz)   Height: 5' 9" (1.753 m)     Body mass index is 35.16 kg/m².  Physical Exam  Vitals reviewed.   Constitutional:       Appearance: Normal appearance.   HENT:      Head: Normocephalic and atraumatic.   Eyes:      Pupils: Pupils are equal, round, and reactive to light.   Skin:     General: Skin is warm and dry.   Neurological:      General: No focal deficit present.      Mental Status: He is alert and oriented to person, place, and time.   Psychiatric:         Mood and Affect: Mood normal.         Behavior: Behavior normal.       The 10-year ASCVD risk score (Kenny RUBALCAVA Jr., et al., 2013) is: 31.1%    Values used to calculate the score:      Age: 77 years      Sex: Male      Is Non- : No      Diabetic: No      Tobacco smoker: No      Systolic Blood Pressure: 122 mmHg      Is BP treated: Yes      HDL Cholesterol: 40 mg/dL      Total Cholesterol: 152 mg/dL        Diagnoses and health risks identified today and associated recommendations/orders:    1. Encounter for preventive health examination    2. Obesity, Class II, BMI 35-39.9  Body mass " index is 35.16 kg/m².  Continue healthy diet and regular exercise as tolerated.  Continue medications as prescribed.  Follow up with PCP, Travis Go MD     3. Hx of CABG  Stable  Continue medications as prescribed.  Follow up with PCP and cardiology, Dr Raygoza    4. Coronary artery disease involving native coronary artery of native heart with other form of angina pectoris  Stable  Continue medications as prescribed.  Follow up with PCP and cardio    5. Essential hypertension  Stable  Continue medications as prescribed.  Follow up with PCP and cardio    6. Status post placement of implantable loop recorder  Stable  Continue medications as prescribed.  Follow up with PCP and cardio    7. Current use of long term anticoagulation  Stable  Continue medications as prescribed.  Follow up with PCP and cardio    8. Methylenetetrahydrofolate reductase deficiency  Stable  Continue medications as prescribed.  Follow up with PCP     9. History of TIA (transient ischemic attack)  Stable  Continue medications as prescribed.  Follow up with PCP and cardio    10. Anthony-tachy syndrome  Stable  Continue medications as prescribed.  Follow up with PCP and cardio    11. History of DVT (deep vein thrombosis)  Stable  Continue medications as prescribed.  Follow up with PCP and vascular as referred to Dr Coleman    12. Benign prostatic hyperplasia with lower urinary tract symptoms, symptom details unspecified  Stable  Continue medications as prescribed.  Follow up with PCP and urology, NITZA Kelley NP    13. IGT (impaired glucose tolerance)  Stable  Continue medications as prescribed.  Follow up with PCP     14. Other reduced mobility  stable      Provided Jeremias with a 5-10 year written screening schedule and personal prevention plan. Recommendations were developed using the USPSTF age appropriate recommendations. Education, counseling, and referrals were provided as needed. After Visit Summary printed and given to patient which  includes a list of additional screenings\tests needed.    Follow up if symptoms worsen or fail to improve, for scheduled appt, 1 year for AWV.    Verena Martinez, NP            I offered to discuss advanced care planning, including how to pick a person who would make decisions for you if you were unable to make them for yourself, called a health care power of , and what kind of decisions you might make such as use of life sustaining treatments such as ventilators and tube feeding when faced with a life limiting illness recorded on a living will that they will need to know. (How you want to be cared for as you near the end of your natural life)     X  Patient has advanced directives written and agrees to provide copies to the institution.

## 2022-11-02 ENCOUNTER — CLINICAL SUPPORT (OUTPATIENT)
Dept: FAMILY MEDICINE | Facility: CLINIC | Age: 77
End: 2022-11-02
Payer: MEDICARE

## 2022-11-02 DIAGNOSIS — Z23 FLU VACCINE NEED: Primary | ICD-10-CM

## 2022-11-02 PROCEDURE — G0008 FLU VACCINE - QUADRIVALENT - ADJUVANTED: ICD-10-PCS | Mod: S$GLB,,, | Performed by: INTERNAL MEDICINE

## 2022-11-02 PROCEDURE — 90694 FLU VACCINE - QUADRIVALENT - ADJUVANTED: ICD-10-PCS | Mod: S$GLB,,, | Performed by: INTERNAL MEDICINE

## 2022-11-02 PROCEDURE — G0008 ADMIN INFLUENZA VIRUS VAC: HCPCS | Mod: S$GLB,,, | Performed by: INTERNAL MEDICINE

## 2022-11-02 PROCEDURE — 90694 VACC AIIV4 NO PRSRV 0.5ML IM: CPT | Mod: S$GLB,,, | Performed by: INTERNAL MEDICINE

## 2022-11-02 NOTE — PROGRESS NOTES
Patient seen today as a nurse visit for flu vaccine. Injection given IM to left deltoid.  Patient tolerated procedure well. HONORIO Yap

## 2023-02-09 ENCOUNTER — OFFICE VISIT (OUTPATIENT)
Dept: FAMILY MEDICINE | Facility: CLINIC | Age: 78
End: 2023-02-09
Payer: MEDICARE

## 2023-02-09 VITALS
RESPIRATION RATE: 18 BRPM | WEIGHT: 237 LBS | SYSTOLIC BLOOD PRESSURE: 134 MMHG | BODY MASS INDEX: 35.1 KG/M2 | DIASTOLIC BLOOD PRESSURE: 86 MMHG | TEMPERATURE: 98 F | HEIGHT: 69 IN | HEART RATE: 66 BPM | OXYGEN SATURATION: 96 %

## 2023-02-09 DIAGNOSIS — I10 ESSENTIAL HYPERTENSION: ICD-10-CM

## 2023-02-09 DIAGNOSIS — I10 HTN, GOAL BELOW 130/80: ICD-10-CM

## 2023-02-09 DIAGNOSIS — Z95.1 HX OF CABG: ICD-10-CM

## 2023-02-09 DIAGNOSIS — B34.9 VIRAL SYNDROME: ICD-10-CM

## 2023-02-09 DIAGNOSIS — I49.5 BRADY-TACHY SYNDROME: ICD-10-CM

## 2023-02-09 DIAGNOSIS — I25.118 CORONARY ARTERY DISEASE INVOLVING NATIVE CORONARY ARTERY OF NATIVE HEART WITH OTHER FORM OF ANGINA PECTORIS: ICD-10-CM

## 2023-02-09 DIAGNOSIS — N40.1 BENIGN PROSTATIC HYPERPLASIA WITH LOWER URINARY TRACT SYMPTOMS, SYMPTOM DETAILS UNSPECIFIED: ICD-10-CM

## 2023-02-09 DIAGNOSIS — I25.10 CORONARY ARTERY DISEASE INVOLVING NATIVE CORONARY ARTERY OF NATIVE HEART WITHOUT ANGINA PECTORIS: ICD-10-CM

## 2023-02-09 DIAGNOSIS — E72.12 METHYLENETETRAHYDROFOLATE REDUCTASE DEFICIENCY: ICD-10-CM

## 2023-02-09 DIAGNOSIS — Z15.89 MTHFR MUTATION: ICD-10-CM

## 2023-02-09 DIAGNOSIS — Z11.9 ENCOUNTER FOR SCREENING EXAMINATION FOR INFECTIOUS DISEASE: Primary | ICD-10-CM

## 2023-02-09 DIAGNOSIS — I48.0 PAF (PAROXYSMAL ATRIAL FIBRILLATION): ICD-10-CM

## 2023-02-09 DIAGNOSIS — E66.01 SEVERE OBESITY (BMI 35.0-39.9) WITH COMORBIDITY: ICD-10-CM

## 2023-02-09 PROBLEM — I82.409 DVT (DEEP VENOUS THROMBOSIS): Status: RESOLVED | Noted: 2022-05-30 | Resolved: 2023-02-09

## 2023-02-09 PROBLEM — I82.412 DVT OF DEEP FEMORAL VEIN, LEFT: Status: RESOLVED | Noted: 2022-05-16 | Resolved: 2023-02-09

## 2023-02-09 LAB
CTP QC/QA: YES
SARS-COV-2 RDRP RESP QL NAA+PROBE: NEGATIVE

## 2023-02-09 PROCEDURE — 87635 SARS-COV-2 COVID-19 AMP PRB: CPT | Mod: QW,S$GLB,, | Performed by: INTERNAL MEDICINE

## 2023-02-09 PROCEDURE — 96372 PR INJECTION,THERAP/PROPH/DIAG2ST, IM OR SUBCUT: ICD-10-PCS | Mod: S$GLB,,, | Performed by: INTERNAL MEDICINE

## 2023-02-09 PROCEDURE — 1101F PR PT FALLS ASSESS DOC 0-1 FALLS W/OUT INJ PAST YR: ICD-10-PCS | Mod: CPTII,S$GLB,, | Performed by: INTERNAL MEDICINE

## 2023-02-09 PROCEDURE — 99213 OFFICE O/P EST LOW 20 MIN: CPT | Mod: 25,S$GLB,, | Performed by: INTERNAL MEDICINE

## 2023-02-09 PROCEDURE — 3288F FALL RISK ASSESSMENT DOCD: CPT | Mod: CPTII,S$GLB,, | Performed by: INTERNAL MEDICINE

## 2023-02-09 PROCEDURE — 3079F DIAST BP 80-89 MM HG: CPT | Mod: CPTII,S$GLB,, | Performed by: INTERNAL MEDICINE

## 2023-02-09 PROCEDURE — 3075F SYST BP GE 130 - 139MM HG: CPT | Mod: CPTII,S$GLB,, | Performed by: INTERNAL MEDICINE

## 2023-02-09 PROCEDURE — 1159F PR MEDICATION LIST DOCUMENTED IN MEDICAL RECORD: ICD-10-PCS | Mod: CPTII,S$GLB,, | Performed by: INTERNAL MEDICINE

## 2023-02-09 PROCEDURE — 87635: ICD-10-PCS | Mod: QW,S$GLB,, | Performed by: INTERNAL MEDICINE

## 2023-02-09 PROCEDURE — 1159F MED LIST DOCD IN RCRD: CPT | Mod: CPTII,S$GLB,, | Performed by: INTERNAL MEDICINE

## 2023-02-09 PROCEDURE — 1126F AMNT PAIN NOTED NONE PRSNT: CPT | Mod: CPTII,S$GLB,, | Performed by: INTERNAL MEDICINE

## 2023-02-09 PROCEDURE — 96372 THER/PROPH/DIAG INJ SC/IM: CPT | Mod: S$GLB,,, | Performed by: INTERNAL MEDICINE

## 2023-02-09 PROCEDURE — 1101F PT FALLS ASSESS-DOCD LE1/YR: CPT | Mod: CPTII,S$GLB,, | Performed by: INTERNAL MEDICINE

## 2023-02-09 PROCEDURE — 1126F PR PAIN SEVERITY QUANTIFIED, NO PAIN PRESENT: ICD-10-PCS | Mod: CPTII,S$GLB,, | Performed by: INTERNAL MEDICINE

## 2023-02-09 PROCEDURE — 99213 PR OFFICE/OUTPT VISIT, EST, LEVL III, 20-29 MIN: ICD-10-PCS | Mod: 25,S$GLB,, | Performed by: INTERNAL MEDICINE

## 2023-02-09 PROCEDURE — 3075F PR MOST RECENT SYSTOLIC BLOOD PRESS GE 130-139MM HG: ICD-10-PCS | Mod: CPTII,S$GLB,, | Performed by: INTERNAL MEDICINE

## 2023-02-09 PROCEDURE — 3288F PR FALLS RISK ASSESSMENT DOCUMENTED: ICD-10-PCS | Mod: CPTII,S$GLB,, | Performed by: INTERNAL MEDICINE

## 2023-02-09 PROCEDURE — 3079F PR MOST RECENT DIASTOLIC BLOOD PRESSURE 80-89 MM HG: ICD-10-PCS | Mod: CPTII,S$GLB,, | Performed by: INTERNAL MEDICINE

## 2023-02-09 RX ORDER — ASPIRIN 81 MG/1
81 TABLET ORAL DAILY
Qty: 90 TABLET | Refills: 3 | Status: SHIPPED | OUTPATIENT
Start: 2023-02-09 | End: 2023-07-25

## 2023-02-09 RX ORDER — HYDROCHLOROTHIAZIDE 12.5 MG/1
12.5 TABLET ORAL DAILY
Qty: 90 TABLET | Refills: 3 | Status: SHIPPED | OUTPATIENT
Start: 2023-02-09

## 2023-02-09 RX ORDER — DEXAMETHASONE SODIUM PHOSPHATE 4 MG/ML
4 INJECTION, SOLUTION INTRA-ARTICULAR; INTRALESIONAL; INTRAMUSCULAR; INTRAVENOUS; SOFT TISSUE
Status: COMPLETED | OUTPATIENT
Start: 2023-02-09 | End: 2023-02-09

## 2023-02-09 RX ORDER — TAMSULOSIN HYDROCHLORIDE 0.4 MG/1
1 CAPSULE ORAL DAILY
Qty: 90 CAPSULE | Refills: 3 | Status: SHIPPED | OUTPATIENT
Start: 2023-02-09

## 2023-02-09 RX ORDER — SOTALOL HYDROCHLORIDE 120 MG/1
120 TABLET ORAL 2 TIMES DAILY
Qty: 180 TABLET | Refills: 3 | Status: SHIPPED | OUTPATIENT
Start: 2023-02-09

## 2023-02-09 RX ORDER — FOLIC ACID 1 MG/1
1000 TABLET ORAL DAILY
Qty: 90 TABLET | Refills: 3 | Status: SHIPPED | OUTPATIENT
Start: 2023-02-09

## 2023-02-09 RX ORDER — PREDNISONE 20 MG/1
20 TABLET ORAL DAILY
Qty: 5 TABLET | Refills: 0 | Status: SHIPPED | OUTPATIENT
Start: 2023-02-09 | End: 2023-07-25

## 2023-02-09 RX ORDER — IRBESARTAN 300 MG/1
300 TABLET ORAL NIGHTLY
Qty: 90 TABLET | Refills: 3 | Status: SHIPPED | OUTPATIENT
Start: 2023-02-09

## 2023-02-09 RX ADMIN — DEXAMETHASONE SODIUM PHOSPHATE 4 MG: 4 INJECTION, SOLUTION INTRA-ARTICULAR; INTRALESIONAL; INTRAMUSCULAR; INTRAVENOUS; SOFT TISSUE at 02:02

## 2023-02-09 NOTE — PROGRESS NOTES
Subjective:       Patient ID: Jeremias Hall is a 77 y.o. male.    Chief Complaint: Follow-up (PT presents to the clinic for a cold. /)    Patient presents because of a cold-like symptom that has not improved with over-the-counter medication.  He has been using Coricidin HBP but continues with cough and rhinorrhea.  There Has been low-grade temperature without nausea vomiting diarrhea or any other constitutional symptoms.      Patient is not experiencing a significant wheezing shortness of breath rhonchi or expectorated from the chest.  Has been no pleurisy or blood-tinged sputum.    He has not been tested for COVID or influenza and both will be carried out today with molecular technology here.      Patient is in no distress.      Vital signs are stable.      He has no insurance through [employer] and request all new prescriptions to be issued to the pharmacy that day specify.  This will be carried out after patient lives the office.    There has been no recent steroid therapy.    Review of Systems   All other systems reviewed and are negative.      Objective:      Physical Exam  Vitals and nursing note reviewed.   Constitutional:       General: He is not in acute distress.     Appearance: Normal appearance. He is obese. He is not ill-appearing, toxic-appearing or diaphoretic.   HENT:      Head: Normocephalic and atraumatic.      Nose: Congestion and rhinorrhea present.   Cardiovascular:      Rate and Rhythm: Normal rate and regular rhythm.      Pulses: Normal pulses.      Heart sounds: Normal heart sounds. No murmur heard.  Pulmonary:      Effort: Pulmonary effort is normal. No respiratory distress.   Skin:     General: Skin is warm and dry.      Coloration: Skin is not jaundiced or pale.   Neurological:      General: No focal deficit present.      Mental Status: He is alert and oriented to person, place, and time. Mental status is at baseline.      Cranial Nerves: No cranial nerve deficit.      Sensory: No sensory  deficit.      Motor: No weakness.      Coordination: Coordination normal.      Gait: Gait normal.   Psychiatric:         Mood and Affect: Mood normal.         Behavior: Behavior normal.         Thought Content: Thought content normal.         Judgment: Judgment normal.       Assessment:       Problem List Items Addressed This Visit          Cardiac/Vascular    Anthony-tachy syndrome    Coronary artery disease involving native coronary artery without angina pectoris    Essential hypertension    Hx of CABG       Endocrine    Methylenetetrahydrofolate reductase deficiency     Other Visit Diagnoses       Encounter for screening examination for infectious disease    -  Primary    Relevant Orders    POCT COVID-19 Rapid Screening (Completed)    POCT Influenza A/B Molecular              Plan:       Patient was negative for both COVID and influenza will be treated empirically with Claritin 10 mg 1 every day, Flonase 2 sprays each nostril once a day, DayQuil and NyQuil he will be receiving dexamethasone 4 mg intramuscular here today to be followed by prednisone 20 mg 1 every day for 5 days.  He is to take with food.    Medications that are part of his chronic therapy list will be issued to the new pharmacy according to his new insurance program through [employer].    Vital Signs remain stable.      He has completed the shingles vaccination protocol.      There is no need for antibiotic therapy or any inhaled therapy for bronchospasm at this time.    Pt is well aware of the signs and symptoms to watch for and to seek medical attention in case these appear

## 2023-07-06 ENCOUNTER — OFFICE VISIT (OUTPATIENT)
Dept: FAMILY MEDICINE | Facility: CLINIC | Age: 78
End: 2023-07-06
Payer: MEDICARE

## 2023-07-06 VITALS
HEIGHT: 69 IN | BODY MASS INDEX: 34.94 KG/M2 | DIASTOLIC BLOOD PRESSURE: 80 MMHG | HEART RATE: 55 BPM | RESPIRATION RATE: 16 BRPM | SYSTOLIC BLOOD PRESSURE: 130 MMHG | OXYGEN SATURATION: 96 % | WEIGHT: 235.88 LBS

## 2023-07-06 DIAGNOSIS — E78.00 HYPERCHOLESTEROLEMIA: ICD-10-CM

## 2023-07-06 DIAGNOSIS — Z12.5 SPECIAL SCREENING FOR MALIGNANT NEOPLASM OF PROSTATE: ICD-10-CM

## 2023-07-06 DIAGNOSIS — I82.412 DVT OF DEEP FEMORAL VEIN, LEFT: ICD-10-CM

## 2023-07-06 DIAGNOSIS — I49.5 BRADY-TACHY SYNDROME: ICD-10-CM

## 2023-07-06 DIAGNOSIS — Z79.899 ENCOUNTER FOR LONG-TERM (CURRENT) USE OF OTHER MEDICATIONS: ICD-10-CM

## 2023-07-06 DIAGNOSIS — Z95.1 HX OF CABG: Primary | ICD-10-CM

## 2023-07-06 DIAGNOSIS — E72.12 METHYLENETETRAHYDROFOLATE REDUCTASE DEFICIENCY: ICD-10-CM

## 2023-07-06 DIAGNOSIS — R73.9 HYPERGLYCEMIA: ICD-10-CM

## 2023-07-06 DIAGNOSIS — I10 ESSENTIAL HYPERTENSION: ICD-10-CM

## 2023-07-06 DIAGNOSIS — E03.9 HYPOTHYROIDISM, UNSPECIFIED TYPE: ICD-10-CM

## 2023-07-06 DIAGNOSIS — D64.9 ANEMIA, UNSPECIFIED TYPE: ICD-10-CM

## 2023-07-06 LAB
ALBUMIN SERPL BCP-MCNC: 3.8 G/DL (ref 3.5–5.2)
ALP SERPL-CCNC: 66 U/L (ref 55–135)
ALT SERPL W/O P-5'-P-CCNC: 7 U/L (ref 10–44)
ANION GAP SERPL CALC-SCNC: 10 MMOL/L (ref 8–16)
AST SERPL-CCNC: 14 U/L (ref 10–40)
BASOPHILS # BLD AUTO: 0.04 K/UL (ref 0–0.2)
BASOPHILS NFR BLD: 0.4 % (ref 0–1.9)
BILIRUB SERPL-MCNC: 0.7 MG/DL (ref 0.1–1)
BUN SERPL-MCNC: 18 MG/DL (ref 8–23)
CALCIUM SERPL-MCNC: 9.2 MG/DL (ref 8.7–10.5)
CHLORIDE SERPL-SCNC: 104 MMOL/L (ref 95–110)
CHOLEST SERPL-MCNC: 166 MG/DL (ref 120–199)
CHOLEST/HDLC SERPL: 4.6 {RATIO} (ref 2–5)
CO2 SERPL-SCNC: 26 MMOL/L (ref 23–29)
COMPLEXED PSA SERPL-MCNC: 1.5 NG/ML (ref 0–4)
CREAT SERPL-MCNC: 1.3 MG/DL (ref 0.5–1.4)
DIFFERENTIAL METHOD: ABNORMAL
EOSINOPHIL # BLD AUTO: 0.3 K/UL (ref 0–0.5)
EOSINOPHIL NFR BLD: 2.9 % (ref 0–8)
ERYTHROCYTE [DISTWIDTH] IN BLOOD BY AUTOMATED COUNT: 13.4 % (ref 11.5–14.5)
EST. GFR  (NO RACE VARIABLE): 55.9 ML/MIN/1.73 M^2
ESTIMATED AVG GLUCOSE: 108 MG/DL (ref 68–131)
GLUCOSE SERPL-MCNC: 93 MG/DL (ref 70–110)
HBA1C MFR BLD: 5.4 % (ref 4–5.6)
HCT VFR BLD AUTO: 43.7 % (ref 40–54)
HDLC SERPL-MCNC: 36 MG/DL (ref 40–75)
HDLC SERPL: 21.7 % (ref 20–50)
HGB BLD-MCNC: 14.6 G/DL (ref 14–18)
IMM GRANULOCYTES # BLD AUTO: 0.03 K/UL (ref 0–0.04)
IMM GRANULOCYTES NFR BLD AUTO: 0.3 % (ref 0–0.5)
LDLC SERPL CALC-MCNC: 92.2 MG/DL (ref 63–159)
LYMPHOCYTES # BLD AUTO: 5.5 K/UL (ref 1–4.8)
LYMPHOCYTES NFR BLD: 52.8 % (ref 18–48)
MCH RBC QN AUTO: 30.9 PG (ref 27–31)
MCHC RBC AUTO-ENTMCNC: 33.4 G/DL (ref 32–36)
MCV RBC AUTO: 93 FL (ref 82–98)
MONOCYTES # BLD AUTO: 0.6 K/UL (ref 0.3–1)
MONOCYTES NFR BLD: 5.6 % (ref 4–15)
NEUTROPHILS # BLD AUTO: 4 K/UL (ref 1.8–7.7)
NEUTROPHILS NFR BLD: 38 % (ref 38–73)
NONHDLC SERPL-MCNC: 130 MG/DL
NRBC BLD-RTO: 0 /100 WBC
PLATELET # BLD AUTO: 200 K/UL (ref 150–450)
PMV BLD AUTO: 11.2 FL (ref 9.2–12.9)
POTASSIUM SERPL-SCNC: 4.4 MMOL/L (ref 3.5–5.1)
PROT SERPL-MCNC: 6.5 G/DL (ref 6–8.4)
RBC # BLD AUTO: 4.72 M/UL (ref 4.6–6.2)
SODIUM SERPL-SCNC: 140 MMOL/L (ref 136–145)
TRIGL SERPL-MCNC: 189 MG/DL (ref 30–150)
TSH SERPL DL<=0.005 MIU/L-ACNC: 1.44 UIU/ML (ref 0.4–4)
WBC # BLD AUTO: 10.46 K/UL (ref 3.9–12.7)

## 2023-07-06 PROCEDURE — 84443 ASSAY THYROID STIM HORMONE: CPT | Performed by: INTERNAL MEDICINE

## 2023-07-06 PROCEDURE — 1126F AMNT PAIN NOTED NONE PRSNT: CPT | Mod: CPTII,S$GLB,, | Performed by: INTERNAL MEDICINE

## 2023-07-06 PROCEDURE — 83036 HEMOGLOBIN GLYCOSYLATED A1C: CPT | Performed by: INTERNAL MEDICINE

## 2023-07-06 PROCEDURE — 1159F MED LIST DOCD IN RCRD: CPT | Mod: CPTII,S$GLB,, | Performed by: INTERNAL MEDICINE

## 2023-07-06 PROCEDURE — 84153 ASSAY OF PSA TOTAL: CPT | Performed by: INTERNAL MEDICINE

## 2023-07-06 PROCEDURE — 80053 COMPREHEN METABOLIC PANEL: CPT | Performed by: INTERNAL MEDICINE

## 2023-07-06 PROCEDURE — 99213 OFFICE O/P EST LOW 20 MIN: CPT | Mod: S$GLB,,, | Performed by: INTERNAL MEDICINE

## 2023-07-06 PROCEDURE — 3075F PR MOST RECENT SYSTOLIC BLOOD PRESS GE 130-139MM HG: ICD-10-PCS | Mod: CPTII,S$GLB,, | Performed by: INTERNAL MEDICINE

## 2023-07-06 PROCEDURE — 1159F PR MEDICATION LIST DOCUMENTED IN MEDICAL RECORD: ICD-10-PCS | Mod: CPTII,S$GLB,, | Performed by: INTERNAL MEDICINE

## 2023-07-06 PROCEDURE — 80061 LIPID PANEL: CPT | Performed by: INTERNAL MEDICINE

## 2023-07-06 PROCEDURE — 3288F PR FALLS RISK ASSESSMENT DOCUMENTED: ICD-10-PCS | Mod: CPTII,S$GLB,, | Performed by: INTERNAL MEDICINE

## 2023-07-06 PROCEDURE — 3075F SYST BP GE 130 - 139MM HG: CPT | Mod: CPTII,S$GLB,, | Performed by: INTERNAL MEDICINE

## 2023-07-06 PROCEDURE — 1101F PR PT FALLS ASSESS DOC 0-1 FALLS W/OUT INJ PAST YR: ICD-10-PCS | Mod: CPTII,S$GLB,, | Performed by: INTERNAL MEDICINE

## 2023-07-06 PROCEDURE — 3079F PR MOST RECENT DIASTOLIC BLOOD PRESSURE 80-89 MM HG: ICD-10-PCS | Mod: CPTII,S$GLB,, | Performed by: INTERNAL MEDICINE

## 2023-07-06 PROCEDURE — 85025 COMPLETE CBC W/AUTO DIFF WBC: CPT | Performed by: INTERNAL MEDICINE

## 2023-07-06 PROCEDURE — 1101F PT FALLS ASSESS-DOCD LE1/YR: CPT | Mod: CPTII,S$GLB,, | Performed by: INTERNAL MEDICINE

## 2023-07-06 PROCEDURE — 1126F PR PAIN SEVERITY QUANTIFIED, NO PAIN PRESENT: ICD-10-PCS | Mod: CPTII,S$GLB,, | Performed by: INTERNAL MEDICINE

## 2023-07-06 PROCEDURE — 3288F FALL RISK ASSESSMENT DOCD: CPT | Mod: CPTII,S$GLB,, | Performed by: INTERNAL MEDICINE

## 2023-07-06 PROCEDURE — 99213 PR OFFICE/OUTPT VISIT, EST, LEVL III, 20-29 MIN: ICD-10-PCS | Mod: S$GLB,,, | Performed by: INTERNAL MEDICINE

## 2023-07-06 PROCEDURE — 3079F DIAST BP 80-89 MM HG: CPT | Mod: CPTII,S$GLB,, | Performed by: INTERNAL MEDICINE

## 2023-07-06 NOTE — PROGRESS NOTES
Subjective     Patient ID: Jeremias Hall is a 78 y.o. male.    Chief Complaint: Follow-up (Patient request referral to cardiology.)    Patient presents for follow-up on requesting referral to a cardiologist.  He has been having difficulty being able to make it to appointments in [place] so he request a physician that comes to the area.  He will be referred to Dr. Luis Antonio Keane with [facility] and they will call in regards to final scheduling details.    Medication list has been reviewed and there is no need for any refills at this time.      He continues on the sotalol at 120 mg dose 1 by mouth twice a day, irbesartan 300 mg 1 by mouth every evening, pravastatin 80 mg 1 at bedtime, hydrochlorothiazide 12.5 mg every morning, 81 mg 1 by mouth every day and Eliquis 5 mg 1 by mouth 2 times a day.      There is no signs of bleeding complication.      On review of the chart he has not had any lab work since May 17, 2022 so will take advantage of the fact that he is here today and lab will be drawn to include a CBC, comprehensive metabolic panel, lipid panel, TSH, hemoglobin A1c and PSA.  Report will be available in the next 6-7 hours and patient will be contacted accordingly.      He has not been followed up with urology lately.  He is still on Flomax 0.4 mg 1 by mouth every day.      His main concern as it pertains to cardiology follow-up is the fact that his brother passed away 7 days ago and he was having issues with the slow heartbeat.  Patient was told before that he my end up needing a pacemaker implantation but as of now no significant bradycardia or conduction abnormality has been identified that warranted same.  Monitoring has been carried out either.  This will be left up to him being evaluated and treated by Cardiology upon referral.  Is otherwise unremarkable as it pertains to cardiorespiratory and central nervous system.      No other care gaps pending to be addressed at this  time.    Review of Systems   All other systems reviewed and are negative.       Objective     Physical Exam  Vitals and nursing note reviewed.   Constitutional:       General: He is not in acute distress.     Appearance: Normal appearance. He is obese. He is not ill-appearing, toxic-appearing or diaphoretic.   HENT:      Head: Normocephalic and atraumatic.   Cardiovascular:      Rate and Rhythm: Normal rate and regular rhythm.      Pulses: Normal pulses.      Heart sounds: Normal heart sounds.   Pulmonary:      Effort: Pulmonary effort is normal.   Musculoskeletal:         General: Normal range of motion.      Right lower leg: No edema.      Left lower leg: No edema.   Skin:     General: Skin is warm and dry.      Coloration: Skin is not pale.   Neurological:      General: No focal deficit present.      Mental Status: He is alert and oriented to person, place, and time. Mental status is at baseline.      Cranial Nerves: No cranial nerve deficit.      Sensory: No sensory deficit.      Motor: No weakness.      Coordination: Coordination normal.      Gait: Gait normal.   Psychiatric:         Mood and Affect: Mood normal.         Behavior: Behavior normal.         Thought Content: Thought content normal.         Judgment: Judgment normal.          Assessment and Plan     1. Hx of CABG    2. Anemia, unspecified type    3. Encounter for long-term (current) use of other medications    4. Hypercholesterolemia    5. Hypothyroidism, unspecified type    6. Special screening for malignant neoplasm of prostate    7. Hyperglycemia    8. DVT of deep femoral vein, left    9. Essential hypertension    10. Methylenetetrahydrofolate reductase deficiency    11. Anthony-tachy syndrome        With regards to his history of coronary bypass grafting patient had a single-vessel bypass and as of now is angina free.  To a different cardiologist that would practice in the area and referral has been made to Dr. Luis Antonio Keane with [facility] and they will call him with regards to final scheduling details.      Medication list has been reviewed and there is no need for any refills at this time.      Patient remains fully anticoagulated with Eliquis 5 mg by mouth twice a day without any signs of bleeding.      Blood pressure is well controlled with current dose of irbesartan 300 mg every day and the hydrochlorothiazide 12.5 mg every day.      Patient remains on sotalol 120 mg 1 by mouth twice a day.      There are no signs of deep venous thrombosis recurrence.      Lab work has been obtained here today including a CBC, comprehensive metabolic panel, lipid panel, TSH, PSA, hemoglobin A1c have the report in the next 6 hours.  Patient was not fasting at the time of the lab work being drawn.  He is compliant to his pravastatin 80 mg at bedtime.      He is interested in receiving the seasonal influenza vaccination when available.    Further workup and treatment according to clinical response and evaluation findings.         No follow-ups on file.

## 2023-07-10 ENCOUNTER — OFFICE VISIT (OUTPATIENT)
Dept: FAMILY MEDICINE | Facility: CLINIC | Age: 78
End: 2023-07-10
Payer: MEDICARE

## 2023-07-10 VITALS
OXYGEN SATURATION: 97 % | HEIGHT: 69 IN | RESPIRATION RATE: 16 BRPM | DIASTOLIC BLOOD PRESSURE: 66 MMHG | HEART RATE: 64 BPM | WEIGHT: 235.88 LBS | BODY MASS INDEX: 34.94 KG/M2 | SYSTOLIC BLOOD PRESSURE: 128 MMHG

## 2023-07-10 DIAGNOSIS — I10 ESSENTIAL HYPERTENSION: ICD-10-CM

## 2023-07-10 DIAGNOSIS — Z95.818 STATUS POST PLACEMENT OF IMPLANTABLE LOOP RECORDER: ICD-10-CM

## 2023-07-10 DIAGNOSIS — I25.118 CORONARY ARTERY DISEASE INVOLVING NATIVE CORONARY ARTERY OF NATIVE HEART WITH OTHER FORM OF ANGINA PECTORIS: ICD-10-CM

## 2023-07-10 DIAGNOSIS — I20.89 ANGINA AT REST: Primary | ICD-10-CM

## 2023-07-10 PROCEDURE — 99213 PR OFFICE/OUTPT VISIT, EST, LEVL III, 20-29 MIN: ICD-10-PCS | Mod: S$GLB,,, | Performed by: INTERNAL MEDICINE

## 2023-07-10 PROCEDURE — 3288F FALL RISK ASSESSMENT DOCD: CPT | Mod: CPTII,S$GLB,, | Performed by: INTERNAL MEDICINE

## 2023-07-10 PROCEDURE — 1126F PR PAIN SEVERITY QUANTIFIED, NO PAIN PRESENT: ICD-10-PCS | Mod: CPTII,S$GLB,, | Performed by: INTERNAL MEDICINE

## 2023-07-10 PROCEDURE — 3074F SYST BP LT 130 MM HG: CPT | Mod: CPTII,S$GLB,, | Performed by: INTERNAL MEDICINE

## 2023-07-10 PROCEDURE — 1101F PR PT FALLS ASSESS DOC 0-1 FALLS W/OUT INJ PAST YR: ICD-10-PCS | Mod: CPTII,S$GLB,, | Performed by: INTERNAL MEDICINE

## 2023-07-10 PROCEDURE — 1101F PT FALLS ASSESS-DOCD LE1/YR: CPT | Mod: CPTII,S$GLB,, | Performed by: INTERNAL MEDICINE

## 2023-07-10 PROCEDURE — 1159F MED LIST DOCD IN RCRD: CPT | Mod: CPTII,S$GLB,, | Performed by: INTERNAL MEDICINE

## 2023-07-10 PROCEDURE — 3074F PR MOST RECENT SYSTOLIC BLOOD PRESSURE < 130 MM HG: ICD-10-PCS | Mod: CPTII,S$GLB,, | Performed by: INTERNAL MEDICINE

## 2023-07-10 PROCEDURE — 99213 OFFICE O/P EST LOW 20 MIN: CPT | Mod: S$GLB,,, | Performed by: INTERNAL MEDICINE

## 2023-07-10 PROCEDURE — 3288F PR FALLS RISK ASSESSMENT DOCUMENTED: ICD-10-PCS | Mod: CPTII,S$GLB,, | Performed by: INTERNAL MEDICINE

## 2023-07-10 PROCEDURE — 3078F DIAST BP <80 MM HG: CPT | Mod: CPTII,S$GLB,, | Performed by: INTERNAL MEDICINE

## 2023-07-10 PROCEDURE — 1126F AMNT PAIN NOTED NONE PRSNT: CPT | Mod: CPTII,S$GLB,, | Performed by: INTERNAL MEDICINE

## 2023-07-10 PROCEDURE — 1159F PR MEDICATION LIST DOCUMENTED IN MEDICAL RECORD: ICD-10-PCS | Mod: CPTII,S$GLB,, | Performed by: INTERNAL MEDICINE

## 2023-07-10 PROCEDURE — 3078F PR MOST RECENT DIASTOLIC BLOOD PRESSURE < 80 MM HG: ICD-10-PCS | Mod: CPTII,S$GLB,, | Performed by: INTERNAL MEDICINE

## 2023-07-10 NOTE — PROGRESS NOTES
Subjective     Patient ID: Jeremias Hall is a 78 y.o. male.    Chief Complaint: Follow-up (Patient reports left arm pain last night. Patient states left leg pain on 7/8/2023.)    As per the chief complaint.      Because of the patient having a history of a prior coronary stent with a history of hypercoagulability and this episode of angina at rest with left arm radiation he is considered to be higher risk and referral to Cardiology to Dr. Luis Antonio Keane has been re-sent.  In the meantime, patient should have a Cardiolite stress test at [facility] to be done by cardiologist available at the time.    No medication changes are carried out now.      EKG now shows no acute changes or changes from prior.      Vital signs are stable with a blood pressure 128/66 and is in normal sinus rhythm in the mid 60s.      He did not have any near-syncope like symptomatology or TIA like symptoms either.      He is fully anticoagulated and there are no signs of bleeding complications.      Copies of the EKG were provided to the patient and have recommended for him to take a picture with his phone for the sake of portability.      He can not perform a regular treadmill because of his orthopedic issues.    Review of Systems   All other systems reviewed and are negative.       Objective     Physical Exam  Vitals and nursing note reviewed.   Constitutional:       General: He is not in acute distress.     Appearance: Normal appearance. He is obese. He is not ill-appearing, toxic-appearing or diaphoretic.   HENT:      Head: Normocephalic and atraumatic.   Cardiovascular:      Rate and Rhythm: Normal rate and regular rhythm.      Pulses: Normal pulses.      Heart sounds: No murmur heard.  Pulmonary:      Effort: Pulmonary effort is normal.      Breath sounds: Normal breath sounds.   Musculoskeletal:      Right lower leg: Edema present.      Left lower leg: Edema present.      Comments: Plus one pretibial edema above the  ankles   Skin:     General: Skin is warm and dry.      Coloration: Skin is not jaundiced or pale.   Neurological:      General: No focal deficit present.      Mental Status: He is alert and oriented to person, place, and time. Mental status is at baseline.      Cranial Nerves: No cranial nerve deficit.      Sensory: No sensory deficit.      Motor: No weakness.      Coordination: Coordination normal.      Gait: Gait normal.   Psychiatric:         Mood and Affect: Mood normal.         Behavior: Behavior normal.         Thought Content: Thought content normal.         Judgment: Judgment normal.          Assessment and Plan     1. Angina at rest    2. Essential hypertension    3. Coronary artery disease involving native coronary artery of native heart with other form of angina pectoris    4. Status post placement of implantable loop recorder        Because of this episode of angina at rest with left arm radiation patient is been referred to [facility] imaging department to have a Cardiolite stress test performed by the cardiologist.  In the meantime the referral to Cardiology with Dr. Luis Antonio Keane will be read issued today.  Dr. Keane has been out of town.      Blood pressure is being controlled but patient has been cutting his irbesartan 300 mg tablets and taking just a quarter.    He remains otherwise cardiorespiratory and central nervous system wise intact.      He is fully anticoagulated with Eliquis 5 mg 1 by mouth twice a day without any signs of bleeding complications.      There are no changes as it pertains to dependent edema.     Patient is very well aware of the signs and symptoms to watch for and he is to seek medical attention in case these appear at the closest emergency room.    Medication list has been reviewed and there is no need for any refills.                   No follow-ups on file.

## 2023-07-17 ENCOUNTER — TELEPHONE (OUTPATIENT)
Dept: FAMILY MEDICINE | Facility: CLINIC | Age: 78
End: 2023-07-17
Payer: MEDICARE

## 2023-07-17 NOTE — TELEPHONE ENCOUNTER
Dr Gaffney contacted Dr Keane regarding the cardiac referral. His clinic should call you with information.

## 2023-07-25 ENCOUNTER — OFFICE VISIT (OUTPATIENT)
Dept: FAMILY MEDICINE | Facility: CLINIC | Age: 78
End: 2023-07-25
Payer: MEDICARE

## 2023-07-25 VITALS
DIASTOLIC BLOOD PRESSURE: 82 MMHG | OXYGEN SATURATION: 95 % | RESPIRATION RATE: 16 BRPM | HEART RATE: 83 BPM | SYSTOLIC BLOOD PRESSURE: 154 MMHG | HEIGHT: 69 IN | WEIGHT: 235.88 LBS | BODY MASS INDEX: 34.94 KG/M2

## 2023-07-25 DIAGNOSIS — Z86.73 HISTORY OF TIA (TRANSIENT ISCHEMIC ATTACK): ICD-10-CM

## 2023-07-25 DIAGNOSIS — Z79.01 CURRENT USE OF LONG TERM ANTICOAGULATION: ICD-10-CM

## 2023-07-25 DIAGNOSIS — E72.12 METHYLENETETRAHYDROFOLATE REDUCTASE DEFICIENCY: ICD-10-CM

## 2023-07-25 DIAGNOSIS — E78.00 HYPERCHOLESTEROLEMIA: ICD-10-CM

## 2023-07-25 DIAGNOSIS — Z95.1 HX OF CABG: ICD-10-CM

## 2023-07-25 DIAGNOSIS — R79.89 AZOTEMIA: ICD-10-CM

## 2023-07-25 DIAGNOSIS — I25.118 CORONARY ARTERY DISEASE INVOLVING NATIVE CORONARY ARTERY OF NATIVE HEART WITH OTHER FORM OF ANGINA PECTORIS: ICD-10-CM

## 2023-07-25 DIAGNOSIS — R94.39 ABNORMAL FINDING ON THALLIUM STRESS TEST: Primary | ICD-10-CM

## 2023-07-25 DIAGNOSIS — I10 ESSENTIAL HYPERTENSION: ICD-10-CM

## 2023-07-25 LAB
ANION GAP SERPL CALC-SCNC: 9 MMOL/L (ref 8–16)
BUN SERPL-MCNC: 12 MG/DL (ref 8–23)
CALCIUM SERPL-MCNC: 8.8 MG/DL (ref 8.7–10.5)
CHLORIDE SERPL-SCNC: 105 MMOL/L (ref 95–110)
CO2 SERPL-SCNC: 27 MMOL/L (ref 23–29)
CREAT SERPL-MCNC: 1.1 MG/DL (ref 0.5–1.4)
EST. GFR  (NO RACE VARIABLE): >60 ML/MIN/1.73 M^2
GLUCOSE SERPL-MCNC: 91 MG/DL (ref 70–110)
POTASSIUM SERPL-SCNC: 4.1 MMOL/L (ref 3.5–5.1)
SODIUM SERPL-SCNC: 141 MMOL/L (ref 136–145)

## 2023-07-25 PROCEDURE — 1159F PR MEDICATION LIST DOCUMENTED IN MEDICAL RECORD: ICD-10-PCS | Mod: CPTII,S$GLB,, | Performed by: INTERNAL MEDICINE

## 2023-07-25 PROCEDURE — 99214 OFFICE O/P EST MOD 30 MIN: CPT | Mod: S$GLB,,, | Performed by: INTERNAL MEDICINE

## 2023-07-25 PROCEDURE — 3077F PR MOST RECENT SYSTOLIC BLOOD PRESSURE >= 140 MM HG: ICD-10-PCS | Mod: CPTII,S$GLB,, | Performed by: INTERNAL MEDICINE

## 2023-07-25 PROCEDURE — 1101F PR PT FALLS ASSESS DOC 0-1 FALLS W/OUT INJ PAST YR: ICD-10-PCS | Mod: CPTII,S$GLB,, | Performed by: INTERNAL MEDICINE

## 2023-07-25 PROCEDURE — 3079F PR MOST RECENT DIASTOLIC BLOOD PRESSURE 80-89 MM HG: ICD-10-PCS | Mod: CPTII,S$GLB,, | Performed by: INTERNAL MEDICINE

## 2023-07-25 PROCEDURE — 3288F PR FALLS RISK ASSESSMENT DOCUMENTED: ICD-10-PCS | Mod: CPTII,S$GLB,, | Performed by: INTERNAL MEDICINE

## 2023-07-25 PROCEDURE — 1126F AMNT PAIN NOTED NONE PRSNT: CPT | Mod: CPTII,S$GLB,, | Performed by: INTERNAL MEDICINE

## 2023-07-25 PROCEDURE — 3077F SYST BP >= 140 MM HG: CPT | Mod: CPTII,S$GLB,, | Performed by: INTERNAL MEDICINE

## 2023-07-25 PROCEDURE — 1159F MED LIST DOCD IN RCRD: CPT | Mod: CPTII,S$GLB,, | Performed by: INTERNAL MEDICINE

## 2023-07-25 PROCEDURE — 1126F PR PAIN SEVERITY QUANTIFIED, NO PAIN PRESENT: ICD-10-PCS | Mod: CPTII,S$GLB,, | Performed by: INTERNAL MEDICINE

## 2023-07-25 PROCEDURE — 1160F RVW MEDS BY RX/DR IN RCRD: CPT | Mod: CPTII,S$GLB,, | Performed by: INTERNAL MEDICINE

## 2023-07-25 PROCEDURE — 1101F PT FALLS ASSESS-DOCD LE1/YR: CPT | Mod: CPTII,S$GLB,, | Performed by: INTERNAL MEDICINE

## 2023-07-25 PROCEDURE — 3288F FALL RISK ASSESSMENT DOCD: CPT | Mod: CPTII,S$GLB,, | Performed by: INTERNAL MEDICINE

## 2023-07-25 PROCEDURE — 1160F PR REVIEW ALL MEDS BY PRESCRIBER/CLIN PHARMACIST DOCUMENTED: ICD-10-PCS | Mod: CPTII,S$GLB,, | Performed by: INTERNAL MEDICINE

## 2023-07-25 PROCEDURE — 99214 PR OFFICE/OUTPT VISIT, EST, LEVL IV, 30-39 MIN: ICD-10-PCS | Mod: S$GLB,,, | Performed by: INTERNAL MEDICINE

## 2023-07-25 PROCEDURE — 80048 BASIC METABOLIC PNL TOTAL CA: CPT | Performed by: INTERNAL MEDICINE

## 2023-07-25 PROCEDURE — 3079F DIAST BP 80-89 MM HG: CPT | Mod: CPTII,S$GLB,, | Performed by: INTERNAL MEDICINE

## 2023-07-25 NOTE — PROGRESS NOTES
Subjective     Patient ID: Jeremias Hall is a 78 y.o. male.    Chief Complaint: Follow-up (Follow up nuclear stress test.)    Patient presents for follow-up of his Cardiolite stress test performed at [facility] on July 14, 2023.  Findings at the time were that ejection fraction was normal at 58% and there was a question of subtle reversibility involving the tiny apical perfusion defect.  Otherwise there appeared to have been relatively normal homogeneous distribution of radiotracer throughout the remaining left ventricular myocardium.  No additional significant reversible or persistent perfusion defects were identified at the time.      Being that the patient is experiencing exertional angina he is to be referred back to cardiology for further evaluation and therapy.    A basic metabolic panel is been drawn here now since the last 1 was over 3 weeks ago in case that the decided to proceed with coronary angiography they have a more recent renal function assessment.      Patient is not experiencing any near-syncope like symptomatology or any central nervous system like symptomatology either.      Medication list has been reviewed and there is no need for any refills at this time.      Blood pressure was slightly elevated today at 154/82 since he was anxious over the discussion of these findings.      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    His cholesterol has been addressed by pravastatin 80 mg every day with the LDL value on July 6, 2023 of 92.  HDL of 36 triglycerides of 189.  At the time patient prefers to be on a stricter diet instead of changing therapy.    Care gaps are up-to-date.            Review of Systems   All other systems reviewed and are negative.       Objective     Physical Exam  Vitals and nursing note reviewed.   Constitutional:        General: He is not in acute distress.     Appearance: Normal appearance. He is obese. He is not ill-appearing, toxic-appearing or diaphoretic.   HENT:      Head: Normocephalic and atraumatic.   Cardiovascular:      Rate and Rhythm: Normal rate.      Pulses: Normal pulses.   Pulmonary:      Effort: Pulmonary effort is normal.   Skin:     General: Skin is warm and dry.      Coloration: Skin is not jaundiced or pale.   Neurological:      General: No focal deficit present.      Mental Status: He is alert and oriented to person, place, and time. Mental status is at baseline.      Cranial Nerves: No cranial nerve deficit.      Sensory: No sensory deficit.      Motor: No weakness.      Coordination: Coordination normal.      Gait: Gait normal.   Psychiatric:         Mood and Affect: Mood normal.         Behavior: Behavior normal.         Thought Content: Thought content normal.         Judgment: Judgment normal.          Assessment and Plan     1. Abnormal finding on thallium stress test    2. Coronary artery disease involving native coronary artery of native heart with other form of angina pectoris    3. History of TIA (transient ischemic attack)    4. Hx of CABG    5. Hypercholesterolemia    6. Essential hypertension    7. Methylenetetrahydrofolate reductase deficiency    8. Current use of long term anticoagulation        Because of this abnormal findings on Cardiolite stress test patient is to be referred back to Cardiology on consultation has been placed by me now.  They will contact the patient with regards to final scheduling details as to appointment at the clinic or proceeding straight to coronary angiography.  Patient is in agreement with either plan of action.      A basic metabolic panel has been drawn here now to assess renal function in case they decide to proceed with coronary angiography.      Medication list has been reviewed and there is no need for refills at this time.      There has been no changes as  it pertains to the exertional angina pattern the patient was experiencing.      Left ventricular function is preserved with the ejection fraction calculated at 50 8% at the time of the stress test yesterday.      Blood pressure was slightly elevated related to anxiety over this diagnosis.      Diet and therapeutic lifestyle changes have already been stressed at the time of the last visit.      Patient remains fully anticoagulated with Eliquis 5 milligrams 1 by mouth twice a day without any signs of bleeding complications.  Hemoglobin was 14.6 on July 6, 2023.      Patient has been instructed that if he experiences any significant angina if need be call 911 or go to the closest emergency room.  He can also take 1 baby aspirin by mouth at the time.         No follow-ups on file.

## 2023-11-15 NOTE — PT/OT/SLP EVAL
Physical Therapy Evaluation and Discharge Note    Patient Name:  Jeremias Hall   MRN:  [id number]    Recommendations:     Discharge Recommendations:      Discharge Equipment Recommendations: none   Barriers to discharge: None    Assessment:     Jeremias Hall is a 75 y.o. male admitted with a medical diagnosis of TIA (transient ischemic attack). .  At this time, patient is functioning at their prior level of function and does not require further acute PT services.     Recent Surgery: * No surgery found *      Plan:     During this hospitalization, patient does not require further acute PT services.  Please re-consult if situation changes.      Subjective     Chief Complaint: none given  Patient/Family Comments/goals: go home  Pain/Comfort:  · Pain Rating 1: 0/10    Patients cultural, spiritual, Mandaen conflicts given the current situation:      Living Environment:  Currently lives with wife in 2 story home but bedroom is  downstairs  Prior to admission, patients level of function was independent.  Equipment used at home: none.  DME owned (not currently used): none.  Upon discharge, patient will have assistance from family.    Objective:     Communicated with nurse prior to session.  Patient found supine with bed alarm upon PT entry to room.    General Precautions: Standard, fall   Orthopedic Precautions:N/A   Braces:       Exams:  · RLE ROM: WFL  · RLE Strength: WNL  · LLE ROM: WFL  · LLE Strength: WNL    Functional Mobility:  · Bed Mobility:     · Supine to Sit: independence  · Transfers:     · Sit to Stand:  independence with no AD  · Gait: x 250 feet with no AD with independence  · Stairs:  Pt ascended/descended 10 stair(s) with No Assistive Device with right handrail with Independent.     AM-PAC 6 CLICK MOBILITY  Total Score:        Therapeutic Activities and Exercises:   none given    AM-PAC 6 CLICK MOBILITY  Total Score:      Patient left up in chair with call button in reach, nurse notified and  granddaughter present.    GOALS:   Multidisciplinary Problems     Physical Therapy Goals     Not on file          Multidisciplinary Problems (Resolved)        Problem: Physical Therapy Goal    Goal Priority Disciplines Outcome Goal Variances Interventions   Physical Therapy Goal   (Resolved)     PT, PT/OT Met            Problem: Physical Therapy Goal    Goal Priority Disciplines Outcome Goal Variances Interventions   Physical Therapy Goal   (Resolved)     PT, PT/OT Met     Description: Goals to be met by: 10/20/2020    Patient will increase functional independence with mobility by performin. Gait  x 250 feet with [place] using no AD                      History:     Past Medical History:   Diagnosis Date    Anticoagulant long-term use     Clotting disorder     Coronary artery disease     Encounter for blood transfusion     Hypertension        Past Surgical History:   Procedure Laterality Date    CHOLECYSTECTOMY      CORONARY ARTERY BYPASS GRAFT      knee surgery ,back surgery(bilateral)         Time Tracking:     PT Received On: 10/19/20  PT Start Time: 1131     PT Stop Time: 1148  PT Total Time (min): 17 min     Billable Minutes: Evaluation 17      Chris Megilligan, PT  10/19/2020   Complex Repair And Double M Plasty Text: The defect edges were debeveled with a #15 scalpel blade.  The primary defect was closed partially with a complex linear closure.  Given the location of the remaining defect, shape of the defect and the proximity to free margins a double M plasty was deemed most appropriate for complete closure of the defect.  Using a sterile surgical marker, an appropriate advancement flap was drawn incorporating the defect and placing the expected incisions within the relaxed skin tension lines where possible. The area thus outlined was incised deep to adipose tissue with a #15 scalpel blade. The skin margins were undermined to an appropriate distance in all directions utilizing iris scissors and carried over to close the primary defect.
